# Patient Record
Sex: MALE | Race: ASIAN | NOT HISPANIC OR LATINO | URBAN - METROPOLITAN AREA
[De-identification: names, ages, dates, MRNs, and addresses within clinical notes are randomized per-mention and may not be internally consistent; named-entity substitution may affect disease eponyms.]

---

## 2017-04-20 ENCOUNTER — OUTPATIENT (OUTPATIENT)
Dept: OUTPATIENT SERVICES | Facility: HOSPITAL | Age: 72
LOS: 1 days | End: 2017-04-20
Payer: COMMERCIAL

## 2017-04-20 VITALS
HEIGHT: 60.5 IN | DIASTOLIC BLOOD PRESSURE: 80 MMHG | WEIGHT: 111.99 LBS | SYSTOLIC BLOOD PRESSURE: 144 MMHG | RESPIRATION RATE: 16 BRPM | TEMPERATURE: 97 F | HEART RATE: 86 BPM

## 2017-04-20 DIAGNOSIS — M65.331 TRIGGER FINGER, RIGHT MIDDLE FINGER: ICD-10-CM

## 2017-04-20 DIAGNOSIS — M50.20 OTHER CERVICAL DISC DISPLACEMENT, UNSPECIFIED CERVICAL REGION: Chronic | ICD-10-CM

## 2017-04-20 DIAGNOSIS — T14.8 OTHER INJURY OF UNSPECIFIED BODY REGION: Chronic | ICD-10-CM

## 2017-04-20 DIAGNOSIS — M65.30 TRIGGER FINGER, UNSPECIFIED FINGER: ICD-10-CM

## 2017-04-20 DIAGNOSIS — E11.9 TYPE 2 DIABETES MELLITUS WITHOUT COMPLICATIONS: ICD-10-CM

## 2017-04-20 DIAGNOSIS — D35.2 BENIGN NEOPLASM OF PITUITARY GLAND: ICD-10-CM

## 2017-04-20 DIAGNOSIS — Z98.89 OTHER SPECIFIED POSTPROCEDURAL STATES: Chronic | ICD-10-CM

## 2017-04-20 LAB
BUN SERPL-MCNC: 20 MG/DL — SIGNIFICANT CHANGE UP (ref 7–23)
CALCIUM SERPL-MCNC: 10.1 MG/DL — SIGNIFICANT CHANGE UP (ref 8.4–10.5)
CHLORIDE SERPL-SCNC: 99 MMOL/L — SIGNIFICANT CHANGE UP (ref 98–107)
CO2 SERPL-SCNC: 26 MMOL/L — SIGNIFICANT CHANGE UP (ref 22–31)
CREAT SERPL-MCNC: 0.8 MG/DL — SIGNIFICANT CHANGE UP (ref 0.5–1.3)
GLUCOSE SERPL-MCNC: 87 MG/DL — SIGNIFICANT CHANGE UP (ref 70–99)
HBA1C BLD-MCNC: 6.6 % — HIGH (ref 4–5.6)
HCT VFR BLD CALC: 42.9 % — SIGNIFICANT CHANGE UP (ref 39–50)
HGB BLD-MCNC: 13.7 G/DL — SIGNIFICANT CHANGE UP (ref 13–17)
MCHC RBC-ENTMCNC: 26.2 PG — LOW (ref 27–34)
MCHC RBC-ENTMCNC: 31.9 % — LOW (ref 32–36)
MCV RBC AUTO: 82 FL — SIGNIFICANT CHANGE UP (ref 80–100)
PLATELET # BLD AUTO: 291 K/UL — SIGNIFICANT CHANGE UP (ref 150–400)
PMV BLD: 9.9 FL — SIGNIFICANT CHANGE UP (ref 7–13)
POTASSIUM SERPL-MCNC: 4.3 MMOL/L — SIGNIFICANT CHANGE UP (ref 3.5–5.3)
POTASSIUM SERPL-SCNC: 4.3 MMOL/L — SIGNIFICANT CHANGE UP (ref 3.5–5.3)
RBC # BLD: 5.23 M/UL — SIGNIFICANT CHANGE UP (ref 4.2–5.8)
RBC # FLD: 16.1 % — HIGH (ref 10.3–14.5)
SODIUM SERPL-SCNC: 137 MMOL/L — SIGNIFICANT CHANGE UP (ref 135–145)
WBC # BLD: 9.52 K/UL — SIGNIFICANT CHANGE UP (ref 3.8–10.5)
WBC # FLD AUTO: 9.52 K/UL — SIGNIFICANT CHANGE UP (ref 3.8–10.5)

## 2017-04-20 PROCEDURE — 93010 ELECTROCARDIOGRAM REPORT: CPT

## 2017-04-20 NOTE — H&P PST ADULT - FAMILY HISTORY
Father  Still living? Unknown  Family history of diabetes mellitus, Age at diagnosis: Age Unknown     Mother  Still living? No  Family history of diabetes mellitus, Age at diagnosis: Age Unknown

## 2017-04-20 NOTE — H&P PST ADULT - HISTORY OF PRESENT ILLNESS
70y/o male presents for preop eval for scheduled right middle finger trigger release on 4/26/17.  Per pt had trigger finger for approximately two years, treated with cortisone injections X2, last dose 3-4 months ago.

## 2017-04-20 NOTE — H&P PST ADULT - PROBLEM SELECTOR PLAN 2
Fs on admit   Pt instructed last dose Jardiance  on 4/24/17  and no diabetic medications on morning of surgery

## 2017-04-20 NOTE — H&P PST ADULT - PROBLEM SELECTOR PLAN 1
scheduled right middle finger trigger release on 4/26/17.  preop instructions, gi prophylaxis given  pt verbalized understanding  pending copy of comparison EKG
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
## 2017-04-20 NOTE — H&P PST ADULT - NSANTHOSAYNRD_GEN_A_CORE
No. RITA screening performed.  STOP BANG Legend: 0-2 = LOW Risk; 3-4 = INTERMEDIATE Risk; 5-8 = HIGH Risk

## 2017-04-20 NOTE — H&P PST ADULT - MUSCULOSKELETAL
negative detailed exam ROM intact/no joint erythema/no joint swelling details… no joint swelling/no joint erythema/right middle finger/decreased ROM due to pain

## 2017-04-20 NOTE — H&P PST ADULT - PMH
BPH (benign prostatic hypertrophy)    Diabetes mellitus type II, controlled    HLD (hyperlipidemia)    HTN (hypertension)  h/o  Trigger finger BPH (benign prostatic hypertrophy)    Diabetes mellitus type II, controlled    HLD (hyperlipidemia)    HTN (hypertension)  h/o  Pituitary adenoma    Trigger finger

## 2017-04-20 NOTE — H&P PST ADULT - NEGATIVE CARDIOVASCULAR SYMPTOMS
no dyspnea on exertion/no claudication/no peripheral edema/no chest pain/no palpitations/no orthopnea/no paroxysmal nocturnal dyspnea

## 2017-04-20 NOTE — H&P PST ADULT - ENDOCRINE COMMENTS
daily fasting FS under 120 daily fasting FS under 90 daily fasting home  FS monitoring done, today 90

## 2017-04-26 ENCOUNTER — OUTPATIENT (OUTPATIENT)
Dept: OUTPATIENT SERVICES | Facility: HOSPITAL | Age: 72
LOS: 1 days | Discharge: HOME | End: 2017-04-26

## 2017-04-26 ENCOUNTER — TRANSCRIPTION ENCOUNTER (OUTPATIENT)
Age: 72
End: 2017-04-26

## 2017-04-26 VITALS
DIASTOLIC BLOOD PRESSURE: 77 MMHG | RESPIRATION RATE: 13 BRPM | TEMPERATURE: 97 F | SYSTOLIC BLOOD PRESSURE: 148 MMHG | OXYGEN SATURATION: 97 % | HEART RATE: 74 BPM

## 2017-04-26 VITALS
OXYGEN SATURATION: 98 % | WEIGHT: 111.99 LBS | RESPIRATION RATE: 18 BRPM | HEART RATE: 73 BPM | TEMPERATURE: 98 F | HEIGHT: 60.5 IN | DIASTOLIC BLOOD PRESSURE: 87 MMHG | SYSTOLIC BLOOD PRESSURE: 138 MMHG

## 2017-04-26 DIAGNOSIS — Z98.89 OTHER SPECIFIED POSTPROCEDURAL STATES: Chronic | ICD-10-CM

## 2017-04-26 DIAGNOSIS — T14.8 OTHER INJURY OF UNSPECIFIED BODY REGION: Chronic | ICD-10-CM

## 2017-04-26 DIAGNOSIS — M50.20 OTHER CERVICAL DISC DISPLACEMENT, UNSPECIFIED CERVICAL REGION: Chronic | ICD-10-CM

## 2017-04-26 DIAGNOSIS — M65.331 TRIGGER FINGER, RIGHT MIDDLE FINGER: ICD-10-CM

## 2017-04-26 NOTE — ASU DISCHARGE PLAN (ADULT/PEDIATRIC). - NURSING INSTRUCTIONS
Narcotic pain medication may cause nausea or constipation. Take medication with food. Increase fluids and fiber intake.   Apply ice for 20 minutes several times per day for the next 24-48 hours, then as needed for comfort.

## 2017-04-26 NOTE — ASU DISCHARGE PLAN (ADULT/PEDIATRIC). - NOTIFY
Bleeding that does not stop/Persistent Nausea and Vomiting/Inability to Tolerate Liquids or Foods/Numbness, color, or temperature change to extremity/Fever greater than 101/Swelling that continues/Pain not relieved by Medications/Unable to Urinate

## 2018-04-30 ENCOUNTER — OUTPATIENT (OUTPATIENT)
Dept: OUTPATIENT SERVICES | Facility: HOSPITAL | Age: 73
LOS: 1 days | End: 2018-04-30
Payer: MEDICARE

## 2018-04-30 VITALS
WEIGHT: 113.98 LBS | DIASTOLIC BLOOD PRESSURE: 70 MMHG | SYSTOLIC BLOOD PRESSURE: 110 MMHG | HEIGHT: 60 IN | RESPIRATION RATE: 16 BRPM | TEMPERATURE: 98 F | OXYGEN SATURATION: 98 % | HEART RATE: 84 BPM

## 2018-04-30 DIAGNOSIS — T14.8 OTHER INJURY OF UNSPECIFIED BODY REGION: Chronic | ICD-10-CM

## 2018-04-30 DIAGNOSIS — M65.331 TRIGGER FINGER, RIGHT MIDDLE FINGER: Chronic | ICD-10-CM

## 2018-04-30 DIAGNOSIS — Z98.89 OTHER SPECIFIED POSTPROCEDURAL STATES: Chronic | ICD-10-CM

## 2018-04-30 DIAGNOSIS — M24.852 OTHER SPECIFIC JOINT DERANGEMENTS OF LEFT HIP, NOT ELSEWHERE CLASSIFIED: ICD-10-CM

## 2018-04-30 DIAGNOSIS — M50.20 OTHER CERVICAL DISC DISPLACEMENT, UNSPECIFIED CERVICAL REGION: Chronic | ICD-10-CM

## 2018-04-30 DIAGNOSIS — Z01.818 ENCOUNTER FOR OTHER PREPROCEDURAL EXAMINATION: ICD-10-CM

## 2018-04-30 PROCEDURE — G0463: CPT

## 2018-04-30 RX ORDER — ACETAMINOPHEN 500 MG
975 TABLET ORAL ONCE
Qty: 0 | Refills: 0 | Status: COMPLETED | OUTPATIENT
Start: 2018-05-08 | End: 2018-05-08

## 2018-04-30 RX ORDER — SODIUM CHLORIDE 9 MG/ML
3 INJECTION INTRAMUSCULAR; INTRAVENOUS; SUBCUTANEOUS EVERY 8 HOURS
Qty: 0 | Refills: 0 | Status: DISCONTINUED | OUTPATIENT
Start: 2018-05-08 | End: 2018-05-16

## 2018-04-30 NOTE — H&P PST ADULT - PMH
BPH (benign prostatic hypertrophy)    Carpal tunnel syndrome, left    Diabetes mellitus type II, controlled    Herniated disc, cervical  hx of , corected with  sx- no limited ROM  HLD (hyperlipidemia)  h/o of , now tx with diet , no meds  HTN (hypertension)  h/o of , now tx with diet , no meds  Left wrist fracture  hx of  Non-recurrent unilateral inguinal hernia without obstruction or gangrene    Other specific joint derangement of left hip, not elsewhere classified    Pituitary adenoma  dx in 2014, MRI of brain done, pt is taking meds for hyperprolactinemia  Trigger finger  right middle finger

## 2018-04-30 NOTE — H&P PST ADULT - GASTROINTESTINAL DETAILS
no distention/no masses palpable/soft/nontender/normal/no guarding/bowel sounds normal/no bruit/no rebound tenderness

## 2018-04-30 NOTE — H&P PST ADULT - NEGATIVE NEUROLOGICAL SYMPTOMS
no headache/no confusion/no paresthesias/no syncope/no vertigo/no loss of consciousness/no tremors/no hemiparesis/no weakness/no generalized seizures/no difficulty walking/no transient paralysis/no focal seizures/no loss of sensation

## 2018-04-30 NOTE — H&P PST ADULT - NEGATIVE OPHTHALMOLOGIC SYMPTOMS
reading and distance glasses/no irritation L/no irritation R/no loss of vision L/no loss of vision R

## 2018-04-30 NOTE — H&P PST ADULT - RS GEN PE MLT RESP DETAILS PC
breath sounds equal/respirations non-labored/no wheezes/clear to auscultation bilaterally/good air movement/airway patent/no rhonchi/no rales

## 2018-04-30 NOTE — H&P PST ADULT - PROBLEM SELECTOR PLAN 1
Patient  is scheduled for diagnostic arthroscopy of the left hip on 5/8/18. patient is at moderate risk for this surgery.

## 2018-04-30 NOTE — H&P PST ADULT - MUSCULOSKELETAL
details… detailed exam no joint swelling/no joint erythema/no joint warmth/diminished strength/no calf tenderness/left hip, pain on rotation and abduction of left hip, tenderness to deep palpation/decreased ROM due to pain

## 2018-04-30 NOTE — H&P PST ADULT - NEGATIVE GENERAL GENITOURINARY SYMPTOMS
no flank pain R/no bladder infections/no urinary hesitancy/hx of BPH/no urine discoloration/no renal colic/no dysuria/normal urinary frequency/no hematuria/no flank pain L

## 2018-04-30 NOTE — H&P PST ADULT - HISTORY OF PRESENT ILLNESS
72 years old male presented with left hip pain  x 1year, related to possible fall, pt was dx with other specific joint derangements of left hip, pt is scheduled for diagnostic arthroscopy of the left hip on 5/8/18.
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
## 2018-04-30 NOTE — H&P PST ADULT - NEGATIVE CARDIOVASCULAR SYMPTOMS
no orthopnea/no claudication/no dyspnea on exertion/no chest pain/no paroxysmal nocturnal dyspnea/no peripheral edema/no palpitations

## 2018-05-07 ENCOUNTER — TRANSCRIPTION ENCOUNTER (OUTPATIENT)
Age: 73
End: 2018-05-07

## 2018-05-08 ENCOUNTER — OUTPATIENT (OUTPATIENT)
Dept: OUTPATIENT SERVICES | Facility: HOSPITAL | Age: 73
LOS: 1 days | End: 2018-05-08
Payer: MEDICARE

## 2018-05-08 VITALS
TEMPERATURE: 98 F | RESPIRATION RATE: 14 BRPM | OXYGEN SATURATION: 99 % | WEIGHT: 113.98 LBS | HEART RATE: 70 BPM | SYSTOLIC BLOOD PRESSURE: 147 MMHG | DIASTOLIC BLOOD PRESSURE: 82 MMHG

## 2018-05-08 VITALS
OXYGEN SATURATION: 100 % | SYSTOLIC BLOOD PRESSURE: 167 MMHG | TEMPERATURE: 98 F | HEART RATE: 61 BPM | RESPIRATION RATE: 14 BRPM | DIASTOLIC BLOOD PRESSURE: 84 MMHG

## 2018-05-08 DIAGNOSIS — M24.852 OTHER SPECIFIC JOINT DERANGEMENTS OF LEFT HIP, NOT ELSEWHERE CLASSIFIED: ICD-10-CM

## 2018-05-08 DIAGNOSIS — Z01.818 ENCOUNTER FOR OTHER PREPROCEDURAL EXAMINATION: ICD-10-CM

## 2018-05-08 DIAGNOSIS — Z98.89 OTHER SPECIFIED POSTPROCEDURAL STATES: Chronic | ICD-10-CM

## 2018-05-08 DIAGNOSIS — M50.20 OTHER CERVICAL DISC DISPLACEMENT, UNSPECIFIED CERVICAL REGION: Chronic | ICD-10-CM

## 2018-05-08 DIAGNOSIS — M65.331 TRIGGER FINGER, RIGHT MIDDLE FINGER: Chronic | ICD-10-CM

## 2018-05-08 DIAGNOSIS — T14.8 OTHER INJURY OF UNSPECIFIED BODY REGION: Chronic | ICD-10-CM

## 2018-05-08 LAB
GLUCOSE BLDC GLUCOMTR-MCNC: 86 MG/DL — SIGNIFICANT CHANGE UP (ref 70–99)
GLUCOSE BLDC GLUCOMTR-MCNC: 90 MG/DL — SIGNIFICANT CHANGE UP (ref 70–99)

## 2018-05-08 PROCEDURE — 76000 FLUOROSCOPY <1 HR PHYS/QHP: CPT

## 2018-05-08 PROCEDURE — 29860 HIP ARTHROSCOPY DX: CPT | Mod: AS,LT

## 2018-05-08 PROCEDURE — 29862 HIP ARTHR0 W/DEBRIDEMENT: CPT | Mod: LT

## 2018-05-08 PROCEDURE — 82962 GLUCOSE BLOOD TEST: CPT

## 2018-05-08 PROCEDURE — 77071 MNL APPL STRS JT RADIOGRAPHY: CPT

## 2018-05-08 PROCEDURE — 76000 FLUOROSCOPY <1 HR PHYS/QHP: CPT | Mod: 26

## 2018-05-08 RX ORDER — OXYCODONE AND ACETAMINOPHEN 5; 325 MG/1; MG/1
2 TABLET ORAL EVERY 6 HOURS
Qty: 0 | Refills: 0 | Status: DISCONTINUED | OUTPATIENT
Start: 2018-05-08 | End: 2018-05-08

## 2018-05-08 RX ORDER — CELECOXIB 200 MG/1
200 CAPSULE ORAL ONCE
Qty: 0 | Refills: 0 | Status: COMPLETED | OUTPATIENT
Start: 2018-05-08 | End: 2018-05-08

## 2018-05-08 RX ORDER — ONDANSETRON 8 MG/1
4 TABLET, FILM COATED ORAL EVERY 6 HOURS
Qty: 0 | Refills: 0 | Status: DISCONTINUED | OUTPATIENT
Start: 2018-05-08 | End: 2018-05-16

## 2018-05-08 RX ORDER — SODIUM CHLORIDE 9 MG/ML
1000 INJECTION, SOLUTION INTRAVENOUS
Qty: 0 | Refills: 0 | Status: DISCONTINUED | OUTPATIENT
Start: 2018-05-08 | End: 2018-05-16

## 2018-05-08 RX ORDER — OXYCODONE AND ACETAMINOPHEN 5; 325 MG/1; MG/1
1 TABLET ORAL EVERY 4 HOURS
Qty: 0 | Refills: 0 | Status: DISCONTINUED | OUTPATIENT
Start: 2018-05-08 | End: 2018-05-08

## 2018-05-08 RX ORDER — HYDROMORPHONE HYDROCHLORIDE 2 MG/ML
0.5 INJECTION INTRAMUSCULAR; INTRAVENOUS; SUBCUTANEOUS
Qty: 0 | Refills: 0 | Status: DISCONTINUED | OUTPATIENT
Start: 2018-05-08 | End: 2018-05-08

## 2018-05-08 RX ADMIN — Medication 975 MILLIGRAM(S): at 08:08

## 2018-05-08 RX ADMIN — CELECOXIB 200 MILLIGRAM(S): 200 CAPSULE ORAL at 08:08

## 2018-05-08 RX ADMIN — SODIUM CHLORIDE 3 MILLILITER(S): 9 INJECTION INTRAMUSCULAR; INTRAVENOUS; SUBCUTANEOUS at 08:00

## 2018-05-08 NOTE — ASU DISCHARGE PLAN (ADULT/PEDIATRIC). - NOTIFY
Persistent Nausea and Vomiting/Fever greater than 101/Numbness, color, or temperature change to extremity/Pain not relieved by Medications/Bleeding that does not stop/Numbness, tingling/Swelling that continues/Unable to Urinate

## 2018-05-08 NOTE — BRIEF OPERATIVE NOTE - PROCEDURE
<<-----Click on this checkbox to enter Procedure Hip arthroscopy, diagnostic  05/08/2018    Active  VALE

## 2018-05-08 NOTE — ASU DISCHARGE PLAN (ADULT/PEDIATRIC). - MEDICATION SUMMARY - MEDICATIONS TO TAKE
I will START or STAY ON the medications listed below when I get home from the hospital:    Percocet 5/325 oral tablet  -- 1-2  tab(s) by mouth every 6 hours, As Needed -for moderate pain MDD:6 Supervising MD: Dr. Issa Freeman. Lic#[id number] NPI [id number]   -- Caution federal law prohibits the transfer of this drug to any person other  than the person for whom it was prescribed.  May cause drowsiness.  Alcohol may intensify this effect.  Use care when operating dangerous machinery.  This prescription cannot be refilled.  This product contains acetaminophen.  Do not use  with any other product containing acetaminophen to prevent possible liver damage.  Using more of this medication than prescribed may cause serious breathing problems.    -- Indication: For pain    metFORMIN 1000 mg oral tablet  -- 1 tab(s) by mouth 2 times a day  -- Indication: For as per md    Januvia 100 mg oral tablet  -- 1 tab(s) by mouth once a day  -- Indication: For as per md    Jardiance 10 mg oral tablet  -- 1 tab(s) by mouth once a day (in the morning)  -- Indication: For as per md    glimepiride 4 mg oral tablet  -- 1 tab(s) by mouth 2 times a day  -- Indication: For as per md    cabergoline 0.5 mg oral tablet  -- 0.5 tab(s) by mouth 2 times a week  -- Indication: For as per md    Centrum oral tablet  -- 1 tab(s) by mouth once a day  -- Indication: For as per md    Vitamin C 1000 mg oral tablet  -- 1 tab(s) by mouth once a day  -- Indication: For as per md    Vitamin D3 5000 intl units oral capsule  -- 1 cap(s) by mouth once a day  -- Indication: For as per md

## 2018-07-16 PROBLEM — M65.30 TRIGGER FINGER, UNSPECIFIED FINGER: Chronic | Status: ACTIVE | Noted: 2017-04-20

## 2018-07-17 PROBLEM — M50.20 OTHER CERVICAL DISC DISPLACEMENT, UNSPECIFIED CERVICAL REGION: Chronic | Status: ACTIVE | Noted: 2018-04-30

## 2019-07-09 ENCOUNTER — INPATIENT (INPATIENT)
Facility: HOSPITAL | Age: 74
LOS: 2 days | Discharge: HOME | End: 2019-07-12
Attending: HOSPITALIST | Admitting: HOSPITALIST
Payer: MEDICARE

## 2019-07-09 VITALS
TEMPERATURE: 98 F | HEART RATE: 88 BPM | RESPIRATION RATE: 16 BRPM | SYSTOLIC BLOOD PRESSURE: 154 MMHG | DIASTOLIC BLOOD PRESSURE: 72 MMHG | OXYGEN SATURATION: 97 %

## 2019-07-09 DIAGNOSIS — M50.20 OTHER CERVICAL DISC DISPLACEMENT, UNSPECIFIED CERVICAL REGION: Chronic | ICD-10-CM

## 2019-07-09 DIAGNOSIS — Z98.89 OTHER SPECIFIED POSTPROCEDURAL STATES: Chronic | ICD-10-CM

## 2019-07-09 DIAGNOSIS — T14.8 OTHER INJURY OF UNSPECIFIED BODY REGION: Chronic | ICD-10-CM

## 2019-07-09 DIAGNOSIS — M65.331 TRIGGER FINGER, RIGHT MIDDLE FINGER: Chronic | ICD-10-CM

## 2019-07-09 LAB
ALBUMIN SERPL ELPH-MCNC: 4.4 G/DL — SIGNIFICANT CHANGE UP (ref 3.3–5)
ALP SERPL-CCNC: 56 U/L — SIGNIFICANT CHANGE UP (ref 40–120)
ALT FLD-CCNC: 37 U/L — SIGNIFICANT CHANGE UP (ref 4–41)
ANION GAP SERPL CALC-SCNC: 16 MMO/L — HIGH (ref 7–14)
APPEARANCE UR: CLEAR — SIGNIFICANT CHANGE UP
AST SERPL-CCNC: 32 U/L — SIGNIFICANT CHANGE UP (ref 4–40)
B PERT DNA SPEC QL NAA+PROBE: NOT DETECTED — SIGNIFICANT CHANGE UP
BASE EXCESS BLDV CALC-SCNC: 2.8 MMOL/L — SIGNIFICANT CHANGE UP
BILIRUB SERPL-MCNC: 0.3 MG/DL — SIGNIFICANT CHANGE UP (ref 0.2–1.2)
BILIRUB UR-MCNC: NEGATIVE — SIGNIFICANT CHANGE UP
BLOOD GAS VENOUS - CREATININE: 0.85 MG/DL — SIGNIFICANT CHANGE UP (ref 0.5–1.3)
BLOOD GAS VENOUS - FIO2: 21 — SIGNIFICANT CHANGE UP
BLOOD UR QL VISUAL: NEGATIVE — SIGNIFICANT CHANGE UP
BUN SERPL-MCNC: 13 MG/DL — SIGNIFICANT CHANGE UP (ref 7–23)
C PNEUM DNA SPEC QL NAA+PROBE: NOT DETECTED — SIGNIFICANT CHANGE UP
CALCIUM SERPL-MCNC: 9.3 MG/DL — SIGNIFICANT CHANGE UP (ref 8.4–10.5)
CHLORIDE BLDV-SCNC: 96 MMOL/L — SIGNIFICANT CHANGE UP (ref 96–108)
CHLORIDE SERPL-SCNC: 92 MMOL/L — LOW (ref 98–107)
CO2 SERPL-SCNC: 22 MMOL/L — SIGNIFICANT CHANGE UP (ref 22–31)
COLOR SPEC: SIGNIFICANT CHANGE UP
CREAT SERPL-MCNC: 0.79 MG/DL — SIGNIFICANT CHANGE UP (ref 0.5–1.3)
FLUAV H1 2009 PAND RNA SPEC QL NAA+PROBE: DETECTED — HIGH
FLUAV H1 RNA SPEC QL NAA+PROBE: NOT DETECTED — SIGNIFICANT CHANGE UP
FLUAV H3 RNA SPEC QL NAA+PROBE: NOT DETECTED — SIGNIFICANT CHANGE UP
FLUBV RNA SPEC QL NAA+PROBE: NOT DETECTED — SIGNIFICANT CHANGE UP
GAS PNL BLDV: 125 MMOL/L — LOW (ref 136–146)
GLUCOSE BLDV-MCNC: 254 MG/DL — HIGH (ref 70–99)
GLUCOSE SERPL-MCNC: 257 MG/DL — HIGH (ref 70–99)
GLUCOSE UR-MCNC: >1000 — HIGH
HADV DNA SPEC QL NAA+PROBE: NOT DETECTED — SIGNIFICANT CHANGE UP
HCO3 BLDV-SCNC: 25 MMOL/L — SIGNIFICANT CHANGE UP (ref 20–27)
HCOV PNL SPEC NAA+PROBE: SIGNIFICANT CHANGE UP
HCT VFR BLD CALC: 43 % — SIGNIFICANT CHANGE UP (ref 39–50)
HCT VFR BLDV CALC: 44.3 % — SIGNIFICANT CHANGE UP (ref 39–51)
HGB BLD-MCNC: 14 G/DL — SIGNIFICANT CHANGE UP (ref 13–17)
HGB BLDV-MCNC: 14.4 G/DL — SIGNIFICANT CHANGE UP (ref 13–17)
HMPV RNA SPEC QL NAA+PROBE: NOT DETECTED — SIGNIFICANT CHANGE UP
HPIV1 RNA SPEC QL NAA+PROBE: NOT DETECTED — SIGNIFICANT CHANGE UP
HPIV2 RNA SPEC QL NAA+PROBE: NOT DETECTED — SIGNIFICANT CHANGE UP
HPIV3 RNA SPEC QL NAA+PROBE: NOT DETECTED — SIGNIFICANT CHANGE UP
HPIV4 RNA SPEC QL NAA+PROBE: NOT DETECTED — SIGNIFICANT CHANGE UP
KETONES UR-MCNC: SIGNIFICANT CHANGE UP
LACTATE BLDV-MCNC: 2.5 MMOL/L — HIGH (ref 0.5–2)
LEUKOCYTE ESTERASE UR-ACNC: NEGATIVE — SIGNIFICANT CHANGE UP
MCHC RBC-ENTMCNC: 27.2 PG — SIGNIFICANT CHANGE UP (ref 27–34)
MCHC RBC-ENTMCNC: 32.6 % — SIGNIFICANT CHANGE UP (ref 32–36)
MCV RBC AUTO: 83.5 FL — SIGNIFICANT CHANGE UP (ref 80–100)
NITRITE UR-MCNC: NEGATIVE — SIGNIFICANT CHANGE UP
NRBC # FLD: 0 K/UL — SIGNIFICANT CHANGE UP (ref 0–0)
PCO2 BLDV: 47 MMHG — SIGNIFICANT CHANGE UP (ref 41–51)
PH BLDV: 7.39 PH — SIGNIFICANT CHANGE UP (ref 7.32–7.43)
PH UR: 6.5 — SIGNIFICANT CHANGE UP (ref 5–8)
PLATELET # BLD AUTO: 206 K/UL — SIGNIFICANT CHANGE UP (ref 150–400)
PMV BLD: 9.4 FL — SIGNIFICANT CHANGE UP (ref 7–13)
PO2 BLDV: 28 MMHG — LOW (ref 35–40)
POTASSIUM BLDV-SCNC: 4.2 MMOL/L — SIGNIFICANT CHANGE UP (ref 3.4–4.5)
POTASSIUM SERPL-MCNC: 5.1 MMOL/L — SIGNIFICANT CHANGE UP (ref 3.5–5.3)
POTASSIUM SERPL-SCNC: 5.1 MMOL/L — SIGNIFICANT CHANGE UP (ref 3.5–5.3)
PROT SERPL-MCNC: 7.7 G/DL — SIGNIFICANT CHANGE UP (ref 6–8.3)
PROT UR-MCNC: NEGATIVE — SIGNIFICANT CHANGE UP
RBC # BLD: 5.15 M/UL — SIGNIFICANT CHANGE UP (ref 4.2–5.8)
RBC # FLD: 14.5 % — SIGNIFICANT CHANGE UP (ref 10.3–14.5)
RSV RNA SPEC QL NAA+PROBE: NOT DETECTED — SIGNIFICANT CHANGE UP
RV+EV RNA SPEC QL NAA+PROBE: NOT DETECTED — SIGNIFICANT CHANGE UP
SAO2 % BLDV: 51.4 % — LOW (ref 60–85)
SODIUM SERPL-SCNC: 130 MMOL/L — LOW (ref 135–145)
SP GR SPEC: 1.02 — SIGNIFICANT CHANGE UP (ref 1–1.04)
UROBILINOGEN FLD QL: NORMAL — SIGNIFICANT CHANGE UP
WBC # BLD: 6.91 K/UL — SIGNIFICANT CHANGE UP (ref 3.8–10.5)
WBC # FLD AUTO: 6.91 K/UL — SIGNIFICANT CHANGE UP (ref 3.8–10.5)

## 2019-07-09 PROCEDURE — 70450 CT HEAD/BRAIN W/O DYE: CPT | Mod: 26

## 2019-07-09 PROCEDURE — 71045 X-RAY EXAM CHEST 1 VIEW: CPT | Mod: 26

## 2019-07-09 RX ORDER — SODIUM CHLORIDE 9 MG/ML
2000 INJECTION INTRAMUSCULAR; INTRAVENOUS; SUBCUTANEOUS ONCE
Refills: 0 | Status: COMPLETED | OUTPATIENT
Start: 2019-07-09 | End: 2019-07-09

## 2019-07-09 RX ORDER — AZITHROMYCIN 500 MG/1
500 TABLET, FILM COATED ORAL ONCE
Refills: 0 | Status: COMPLETED | OUTPATIENT
Start: 2019-07-09 | End: 2019-07-09

## 2019-07-09 RX ORDER — ACETAMINOPHEN 500 MG
650 TABLET ORAL ONCE
Refills: 0 | Status: COMPLETED | OUTPATIENT
Start: 2019-07-09 | End: 2019-07-09

## 2019-07-09 RX ORDER — CEFTRIAXONE 500 MG/1
1000 INJECTION, POWDER, FOR SOLUTION INTRAMUSCULAR; INTRAVENOUS ONCE
Refills: 0 | Status: COMPLETED | OUTPATIENT
Start: 2019-07-09 | End: 2019-07-09

## 2019-07-09 RX ORDER — ONDANSETRON 8 MG/1
4 TABLET, FILM COATED ORAL ONCE
Refills: 0 | Status: COMPLETED | OUTPATIENT
Start: 2019-07-09 | End: 2019-07-09

## 2019-07-09 RX ADMIN — CEFTRIAXONE 100 MILLIGRAM(S): 500 INJECTION, POWDER, FOR SOLUTION INTRAMUSCULAR; INTRAVENOUS at 21:21

## 2019-07-09 RX ADMIN — Medication 650 MILLIGRAM(S): at 21:20

## 2019-07-09 RX ADMIN — ONDANSETRON 4 MILLIGRAM(S): 8 TABLET, FILM COATED ORAL at 21:21

## 2019-07-09 RX ADMIN — SODIUM CHLORIDE 2000 MILLILITER(S): 9 INJECTION INTRAMUSCULAR; INTRAVENOUS; SUBCUTANEOUS at 21:21

## 2019-07-09 NOTE — ED ADULT NURSE REASSESSMENT NOTE - NS ED NURSE REASSESS COMMENT FT1
pt found laying supine on floor near stretcher, pt denies pain, no bruising abrasions or bleeding noted. MD Urban and MD Mei aware and at bedside for eval. order for head placed. VS as noted. pt A&Ox2 disoriented to time. staff member at bedside for enhanced supervision.

## 2019-07-09 NOTE — ED PROVIDER NOTE - OBJECTIVE STATEMENT
74yo M h/o DM p/w AMS, fever x4d Tm 105. + productive cough. + n/v. history gathered entirely from patient. reports some confusion over the last several days. Had been eating/drinking well prior to 4d ago. denies urinary symptoms.

## 2019-07-09 NOTE — ED PROVIDER NOTE - PHYSICAL EXAMINATION
Vitals: WNL  Gen: laying comfortably in NAD, urinated on himself, coughing  Head: NCAT  ENT: sclerae white, anicterus, moist mucous membranes. No exudates.   CV: RRR. Audible S1 and S2. No murmurs, rubs, gallops, S3, nor S4  Pulm: Clear to auscultation bilaterally. No wheezes, rales, or rhonchi  Abd: soft, normoactive BS x4, NTND, no rebound, no guarding, no rashes  Musculoskeletal:  No peripheral edema  Skin: no lesions or scars noted  Neurologic: AAOx3  : no CVA tenderness

## 2019-07-09 NOTE — ED ADULT NURSE REASSESSMENT NOTE - INTERVENTIONS DEFINITIONS
Monitor for mental status changes and reorient to person, place, and time/Stretcher in lowest position, wheels locked, appropriate side rails in place/Non-slip footwear when patient is off stretcher/Provide visual cue, wrist band, yellow gown, etc./Provide visual clues: red socks/Physically safe environment: no spills, clutter or unnecessary equipment

## 2019-07-09 NOTE — ED PROVIDER NOTE - ATTENDING CONTRIBUTION TO CARE
agree with resident note  "72yo M h/o DM p/w AMS, fever x4d Tm 105. + productive cough. + n/v. history gathered entirely from patient. reports some confusion over the last several days. Had been eating/drinking well prior to 4d ago. denies urinary symptoms."  States was confused but better today    PE: febrile, tachycardic, CTAB/L; s1 s2 no m/r/g abd soft/NT/ND ext: no edema    Imp: sepsis; RVP returned back +influenza; admit given age and vital signs

## 2019-07-09 NOTE — ED PROVIDER NOTE - PROGRESS NOTE DETAILS
Sigifredo ORTEGA: called to bedside, pt found on the ground beside bed. pt reports he was trying to walk and fell. pt intermittently confused. will obtain CTH.

## 2019-07-09 NOTE — ED ADULT NURSE NOTE - CHIEF COMPLAINT QUOTE
brought in by EMS from MD office for C/o generalized weakness and was vomiting at MD office. As per EMS, wife stated to them that he has been having fevers at home and was hallucinating. Pt was picking things out in the air with fingers. RF=548. Pt on 3L NC by EMS, #20 angio to left wrist with 300ml NS bolus infused.

## 2019-07-09 NOTE — ED ADULT NURSE NOTE - OBJECTIVE STATEMENT
RN Facilitator: PT received into room 9 A&Ox4, ambulatory C/O fevers, nausea, vomiting & generalized weakness x 3 days. Pt states symptoms worsened today, went to PCP and was told to come to ED. PMH: DM. Denies CP, SOB, urinary symptoms. Md evaluated, VS as noted. 18 G IV placed to R ARM, labs sent, cultures sent. Medicated as ordered. Comfort measures provided, call bell within reach,. Report given to primary area RN.

## 2019-07-09 NOTE — ED ADULT TRIAGE NOTE - CHIEF COMPLAINT QUOTE
brought in by EMS from MD office for sepsis vs CVA. C/o generalized weakness and was vomiting at MD office. As per EMS, wife stated to them that he has been having fevers at home and was hallucinating. Pt was picking things out in the air with fingers. HA=555. Pt on #L NC by EMS, #20 angio to left wrist with 300ml NS bolus infused. brought in by EMS from MD office for C/o generalized weakness and was vomiting at MD office. As per EMS, wife stated to them that he has been having fevers at home and was hallucinating. Pt was picking things out in the air with fingers. JL=149. Pt on #L NC by EMS, #20 angio to left wrist with 300ml NS bolus infused. brought in by EMS from MD office for C/o generalized weakness and was vomiting at MD office. As per EMS, wife stated to them that he has been having fevers at home and was hallucinating. Pt was picking things out in the air with fingers. SB=484. Pt on 3L NC by EMS, #20 angio to left wrist with 300ml NS bolus infused.

## 2019-07-09 NOTE — ED PROVIDER NOTE - NS ED ROS FT
Constitutional: no fevers, chills  HEENT: no visual changes, no sore throat, no rhinorrhea  CV: no cp  Resp: no sob  GI: no abd pain,+ n/v, no diarrhea/constipation  : no dysuria, hematuria  MSK: no joint pains  skin: no rashes  neuro: no HA, no confusion  psych: no SI/HI  heme: no LAD

## 2019-07-10 DIAGNOSIS — Z29.9 ENCOUNTER FOR PROPHYLACTIC MEASURES, UNSPECIFIED: ICD-10-CM

## 2019-07-10 DIAGNOSIS — A41.9 SEPSIS, UNSPECIFIED ORGANISM: ICD-10-CM

## 2019-07-10 DIAGNOSIS — J10.1 INFLUENZA DUE TO OTHER IDENTIFIED INFLUENZA VIRUS WITH OTHER RESPIRATORY MANIFESTATIONS: ICD-10-CM

## 2019-07-10 DIAGNOSIS — G92 TOXIC ENCEPHALOPATHY: ICD-10-CM

## 2019-07-10 DIAGNOSIS — M25.531 PAIN IN RIGHT WRIST: ICD-10-CM

## 2019-07-10 DIAGNOSIS — D35.2 BENIGN NEOPLASM OF PITUITARY GLAND: ICD-10-CM

## 2019-07-10 DIAGNOSIS — E11.9 TYPE 2 DIABETES MELLITUS WITHOUT COMPLICATIONS: ICD-10-CM

## 2019-07-10 DIAGNOSIS — R63.8 OTHER SYMPTOMS AND SIGNS CONCERNING FOOD AND FLUID INTAKE: ICD-10-CM

## 2019-07-10 DIAGNOSIS — E87.1 HYPO-OSMOLALITY AND HYPONATREMIA: ICD-10-CM

## 2019-07-10 DIAGNOSIS — J11.1 INFLUENZA DUE TO UNIDENTIFIED INFLUENZA VIRUS WITH OTHER RESPIRATORY MANIFESTATIONS: ICD-10-CM

## 2019-07-10 PROBLEM — M24.852 OTHER SPECIFIC JOINT DERANGEMENTS OF LEFT HIP, NOT ELSEWHERE CLASSIFIED: Chronic | Status: ACTIVE | Noted: 2018-04-30

## 2019-07-10 PROBLEM — K40.90 UNILATERAL INGUINAL HERNIA, WITHOUT OBSTRUCTION OR GANGRENE, NOT SPECIFIED AS RECURRENT: Chronic | Status: ACTIVE | Noted: 2018-04-30

## 2019-07-10 PROBLEM — G56.02 CARPAL TUNNEL SYNDROME, LEFT UPPER LIMB: Chronic | Status: ACTIVE | Noted: 2018-04-30

## 2019-07-10 PROBLEM — S62.102A FRACTURE OF UNSPECIFIED CARPAL BONE, LEFT WRIST, INITIAL ENCOUNTER FOR CLOSED FRACTURE: Chronic | Status: ACTIVE | Noted: 2018-04-30

## 2019-07-10 LAB
ANION GAP SERPL CALC-SCNC: 10 MMO/L — SIGNIFICANT CHANGE UP (ref 7–14)
ANISOCYTOSIS BLD QL: SLIGHT — SIGNIFICANT CHANGE UP
BASE EXCESS BLDV CALC-SCNC: -0.3 MMOL/L — SIGNIFICANT CHANGE UP
BASE EXCESS BLDV CALC-SCNC: 2.2 MMOL/L — SIGNIFICANT CHANGE UP
BASOPHILS # BLD AUTO: 0.01 K/UL — SIGNIFICANT CHANGE UP (ref 0–0.2)
BASOPHILS NFR BLD AUTO: 0.2 % — SIGNIFICANT CHANGE UP (ref 0–2)
BASOPHILS NFR SPEC: 0 % — SIGNIFICANT CHANGE UP (ref 0–2)
BLASTS # FLD: 0 % — SIGNIFICANT CHANGE UP (ref 0–0)
BLOOD GAS VENOUS - CREATININE: 0.76 MG/DL — SIGNIFICANT CHANGE UP (ref 0.5–1.3)
BLOOD GAS VENOUS - FIO2: 21 — SIGNIFICANT CHANGE UP
BUN SERPL-MCNC: 10 MG/DL — SIGNIFICANT CHANGE UP (ref 7–23)
CALCIUM SERPL-MCNC: 8.6 MG/DL — SIGNIFICANT CHANGE UP (ref 8.4–10.5)
CHLORIDE BLDV-SCNC: 96 MMOL/L — SIGNIFICANT CHANGE UP (ref 96–108)
CHLORIDE SERPL-SCNC: 97 MMOL/L — LOW (ref 98–107)
CO2 SERPL-SCNC: 24 MMOL/L — SIGNIFICANT CHANGE UP (ref 22–31)
CREAT SERPL-MCNC: 0.73 MG/DL — SIGNIFICANT CHANGE UP (ref 0.5–1.3)
EOSINOPHIL # BLD AUTO: 0.14 K/UL — SIGNIFICANT CHANGE UP (ref 0–0.5)
EOSINOPHIL NFR BLD AUTO: 2.3 % — SIGNIFICANT CHANGE UP (ref 0–6)
EOSINOPHIL NFR FLD: 0 % — SIGNIFICANT CHANGE UP (ref 0–6)
GAS PNL BLDV: 126 MMOL/L — LOW (ref 136–146)
GAS PNL BLDV: 127 MMOL/L — LOW (ref 136–146)
GLUCOSE BLDV-MCNC: 152 MG/DL — HIGH (ref 70–99)
GLUCOSE BLDV-MCNC: 260 MG/DL — HIGH (ref 70–99)
GLUCOSE SERPL-MCNC: 163 MG/DL — HIGH (ref 70–99)
HCO3 BLDV-SCNC: 23 MMOL/L — SIGNIFICANT CHANGE UP (ref 20–27)
HCO3 BLDV-SCNC: 26 MMOL/L — SIGNIFICANT CHANGE UP (ref 20–27)
HCT VFR BLD CALC: 39.4 % — SIGNIFICANT CHANGE UP (ref 39–50)
HCT VFR BLDV CALC: 40 % — SIGNIFICANT CHANGE UP (ref 39–51)
HCT VFR BLDV CALC: 41.3 % — SIGNIFICANT CHANGE UP (ref 39–51)
HGB BLD-MCNC: 13 G/DL — SIGNIFICANT CHANGE UP (ref 13–17)
HGB BLDV-MCNC: 13 G/DL — SIGNIFICANT CHANGE UP (ref 13–17)
HGB BLDV-MCNC: 13.5 G/DL — SIGNIFICANT CHANGE UP (ref 13–17)
IMM GRANULOCYTES NFR BLD AUTO: 0.7 % — SIGNIFICANT CHANGE UP (ref 0–1.5)
LACTATE BLDV-MCNC: 1.5 MMOL/L — SIGNIFICANT CHANGE UP (ref 0.5–2)
LACTATE BLDV-MCNC: 3.8 MMOL/L — HIGH (ref 0.5–2)
LYMPHOCYTES # BLD AUTO: 0.84 K/UL — LOW (ref 1–3.3)
LYMPHOCYTES # BLD AUTO: 13.9 % — SIGNIFICANT CHANGE UP (ref 13–44)
LYMPHOCYTES NFR SPEC AUTO: 4.5 % — LOW (ref 13–44)
MACROCYTES BLD QL: SLIGHT — SIGNIFICANT CHANGE UP
MAGNESIUM SERPL-MCNC: 1.9 MG/DL — SIGNIFICANT CHANGE UP (ref 1.6–2.6)
MCHC RBC-ENTMCNC: 27.3 PG — SIGNIFICANT CHANGE UP (ref 27–34)
MCHC RBC-ENTMCNC: 33 % — SIGNIFICANT CHANGE UP (ref 32–36)
MCV RBC AUTO: 82.6 FL — SIGNIFICANT CHANGE UP (ref 80–100)
METAMYELOCYTES # FLD: 0 % — SIGNIFICANT CHANGE UP (ref 0–1)
MICROCYTES BLD QL: SIGNIFICANT CHANGE UP
MONOCYTES # BLD AUTO: 0.55 K/UL — SIGNIFICANT CHANGE UP (ref 0–0.9)
MONOCYTES NFR BLD AUTO: 9.1 % — SIGNIFICANT CHANGE UP (ref 2–14)
MONOCYTES NFR BLD: 9 % — SIGNIFICANT CHANGE UP (ref 2–9)
MYELOCYTES NFR BLD: 0 % — SIGNIFICANT CHANGE UP (ref 0–0)
NEUTROPHIL AB SER-ACNC: 68.5 % — SIGNIFICANT CHANGE UP (ref 43–77)
NEUTROPHILS # BLD AUTO: 4.45 K/UL — SIGNIFICANT CHANGE UP (ref 1.8–7.4)
NEUTROPHILS NFR BLD AUTO: 73.8 % — SIGNIFICANT CHANGE UP (ref 43–77)
NEUTS BAND # BLD: 17.1 % — HIGH (ref 0–6)
NRBC # FLD: 0 K/UL — SIGNIFICANT CHANGE UP (ref 0–0)
OTHER - HEMATOLOGY %: 0 — SIGNIFICANT CHANGE UP
OVALOCYTES BLD QL SMEAR: SLIGHT — SIGNIFICANT CHANGE UP
PCO2 BLDV: 38 MMHG — LOW (ref 41–51)
PCO2 BLDV: 48 MMHG — SIGNIFICANT CHANGE UP (ref 41–51)
PH BLDV: 7.33 PH — SIGNIFICANT CHANGE UP (ref 7.32–7.43)
PH BLDV: 7.45 PH — HIGH (ref 7.32–7.43)
PHOSPHATE SERPL-MCNC: 2.4 MG/DL — LOW (ref 2.5–4.5)
PLATELET # BLD AUTO: 200 K/UL — SIGNIFICANT CHANGE UP (ref 150–400)
PLATELET COUNT - ESTIMATE: NORMAL — SIGNIFICANT CHANGE UP
PMV BLD: 9.9 FL — SIGNIFICANT CHANGE UP (ref 7–13)
PO2 BLDV: 26 MMHG — LOW (ref 35–40)
PO2 BLDV: 41 MMHG — HIGH (ref 35–40)
POIKILOCYTOSIS BLD QL AUTO: SLIGHT — SIGNIFICANT CHANGE UP
POTASSIUM BLDV-SCNC: 3.9 MMOL/L — SIGNIFICANT CHANGE UP (ref 3.4–4.5)
POTASSIUM BLDV-SCNC: 4 MMOL/L — SIGNIFICANT CHANGE UP (ref 3.4–4.5)
POTASSIUM SERPL-MCNC: 4.1 MMOL/L — SIGNIFICANT CHANGE UP (ref 3.5–5.3)
POTASSIUM SERPL-SCNC: 4.1 MMOL/L — SIGNIFICANT CHANGE UP (ref 3.5–5.3)
PROMYELOCYTES # FLD: 0 % — SIGNIFICANT CHANGE UP (ref 0–0)
RBC # BLD: 4.77 M/UL — SIGNIFICANT CHANGE UP (ref 4.2–5.8)
RBC # FLD: 14.6 % — HIGH (ref 10.3–14.5)
SAO2 % BLDV: 41.3 % — LOW (ref 60–85)
SAO2 % BLDV: 76.8 % — SIGNIFICANT CHANGE UP (ref 60–85)
SMUDGE CELLS # BLD: PRESENT — SIGNIFICANT CHANGE UP
SODIUM SERPL-SCNC: 131 MMOL/L — LOW (ref 135–145)
SPECIMEN SOURCE: SIGNIFICANT CHANGE UP
VARIANT LYMPHS # BLD: 0.9 % — SIGNIFICANT CHANGE UP
WBC # BLD: 6.03 K/UL — SIGNIFICANT CHANGE UP (ref 3.8–10.5)
WBC # FLD AUTO: 6.03 K/UL — SIGNIFICANT CHANGE UP (ref 3.8–10.5)

## 2019-07-10 PROCEDURE — 73070 X-RAY EXAM OF ELBOW: CPT | Mod: 26,RT

## 2019-07-10 PROCEDURE — 73110 X-RAY EXAM OF WRIST: CPT | Mod: 26,RT

## 2019-07-10 PROCEDURE — 99233 SBSQ HOSP IP/OBS HIGH 50: CPT

## 2019-07-10 RX ORDER — METFORMIN HYDROCHLORIDE 850 MG/1
1 TABLET ORAL
Qty: 0 | Refills: 0 | DISCHARGE

## 2019-07-10 RX ORDER — ENOXAPARIN SODIUM 100 MG/ML
40 INJECTION SUBCUTANEOUS DAILY
Refills: 0 | Status: DISCONTINUED | OUTPATIENT
Start: 2019-07-10 | End: 2019-07-12

## 2019-07-10 RX ORDER — ASCORBIC ACID 60 MG
1 TABLET,CHEWABLE ORAL
Qty: 0 | Refills: 0 | DISCHARGE

## 2019-07-10 RX ORDER — DEXTROSE 50 % IN WATER 50 %
25 SYRINGE (ML) INTRAVENOUS ONCE
Refills: 0 | Status: DISCONTINUED | OUTPATIENT
Start: 2019-07-10 | End: 2019-07-12

## 2019-07-10 RX ORDER — CHOLECALCIFEROL (VITAMIN D3) 125 MCG
1 CAPSULE ORAL
Qty: 0 | Refills: 0 | DISCHARGE

## 2019-07-10 RX ORDER — MULTIVIT-MIN/FERROUS GLUCONATE 9 MG/15 ML
1 LIQUID (ML) ORAL
Qty: 0 | Refills: 0 | DISCHARGE

## 2019-07-10 RX ORDER — EMPAGLIFLOZIN 10 MG/1
1 TABLET, FILM COATED ORAL
Qty: 0 | Refills: 0 | DISCHARGE

## 2019-07-10 RX ORDER — SITAGLIPTIN 50 MG/1
1 TABLET, FILM COATED ORAL
Qty: 0 | Refills: 0 | DISCHARGE

## 2019-07-10 RX ORDER — ACETAMINOPHEN 500 MG
650 TABLET ORAL EVERY 6 HOURS
Refills: 0 | Status: DISCONTINUED | OUTPATIENT
Start: 2019-07-10 | End: 2019-07-12

## 2019-07-10 RX ORDER — CABERGOLINE 0.5 MG/1
0.5 TABLET ORAL
Qty: 0 | Refills: 0 | DISCHARGE

## 2019-07-10 RX ORDER — SIMVASTATIN 20 MG/1
10 TABLET, FILM COATED ORAL AT BEDTIME
Refills: 0 | Status: DISCONTINUED | OUTPATIENT
Start: 2019-07-10 | End: 2019-07-12

## 2019-07-10 RX ORDER — SODIUM CHLORIDE 9 MG/ML
1000 INJECTION INTRAMUSCULAR; INTRAVENOUS; SUBCUTANEOUS
Refills: 0 | Status: DISCONTINUED | OUTPATIENT
Start: 2019-07-10 | End: 2019-07-12

## 2019-07-10 RX ORDER — SIMVASTATIN 20 MG/1
1 TABLET, FILM COATED ORAL
Qty: 0 | Refills: 0 | DISCHARGE

## 2019-07-10 RX ORDER — GLIMEPIRIDE 1 MG
1 TABLET ORAL
Qty: 0 | Refills: 0 | DISCHARGE

## 2019-07-10 RX ORDER — INSULIN LISPRO 100/ML
VIAL (ML) SUBCUTANEOUS
Refills: 0 | Status: DISCONTINUED | OUTPATIENT
Start: 2019-07-10 | End: 2019-07-12

## 2019-07-10 RX ORDER — DEXTROSE 50 % IN WATER 50 %
12.5 SYRINGE (ML) INTRAVENOUS ONCE
Refills: 0 | Status: DISCONTINUED | OUTPATIENT
Start: 2019-07-10 | End: 2019-07-12

## 2019-07-10 RX ORDER — DEXTROSE 50 % IN WATER 50 %
15 SYRINGE (ML) INTRAVENOUS ONCE
Refills: 0 | Status: DISCONTINUED | OUTPATIENT
Start: 2019-07-10 | End: 2019-07-12

## 2019-07-10 RX ORDER — GLUCAGON INJECTION, SOLUTION 0.5 MG/.1ML
1 INJECTION, SOLUTION SUBCUTANEOUS ONCE
Refills: 0 | Status: DISCONTINUED | OUTPATIENT
Start: 2019-07-10 | End: 2019-07-12

## 2019-07-10 RX ORDER — SODIUM CHLORIDE 9 MG/ML
1000 INJECTION, SOLUTION INTRAVENOUS
Refills: 0 | Status: DISCONTINUED | OUTPATIENT
Start: 2019-07-10 | End: 2019-07-12

## 2019-07-10 RX ADMIN — Medication 4: at 18:26

## 2019-07-10 RX ADMIN — SODIUM CHLORIDE 75 MILLILITER(S): 9 INJECTION INTRAMUSCULAR; INTRAVENOUS; SUBCUTANEOUS at 03:50

## 2019-07-10 RX ADMIN — Medication 75 MILLIGRAM(S): at 00:52

## 2019-07-10 RX ADMIN — Medication 650 MILLIGRAM(S): at 20:28

## 2019-07-10 RX ADMIN — Medication 650 MILLIGRAM(S): at 16:53

## 2019-07-10 RX ADMIN — Medication 75 MILLIGRAM(S): at 08:39

## 2019-07-10 RX ADMIN — SODIUM CHLORIDE 75 MILLILITER(S): 9 INJECTION INTRAMUSCULAR; INTRAVENOUS; SUBCUTANEOUS at 08:39

## 2019-07-10 RX ADMIN — SIMVASTATIN 10 MILLIGRAM(S): 20 TABLET, FILM COATED ORAL at 23:48

## 2019-07-10 RX ADMIN — Medication 100 MILLIGRAM(S): at 16:53

## 2019-07-10 RX ADMIN — Medication 75 MILLIGRAM(S): at 18:26

## 2019-07-10 RX ADMIN — AZITHROMYCIN 250 MILLIGRAM(S): 500 TABLET, FILM COATED ORAL at 00:36

## 2019-07-10 RX ADMIN — Medication 100 MILLIGRAM(S): at 23:47

## 2019-07-10 NOTE — H&P ADULT - NSHPLABSRESULTS_GEN_ALL_CORE
.  LABS:                         13.0   6.03  )-----------( 200      ( 10 Jul 2019 07:40 )             39.4     07-10    131<L>  |  97<L>  |  10  ----------------------------<  163<H>  4.1   |  24  |  0.73    Ca    8.6      10 Jul 2019 07:40  Phos  2.4     07-10  Mg     1.9     07-10    TPro  7.7  /  Alb  4.4  /  TBili  0.3  /  DBili  x   /  AST  32  /  ALT  37  /  AlkPhos  56  07-      Urinalysis Basic - ( 2019 23:20 )    Color: LIGHT YELLOW / Appearance: CLEAR / S.019 / pH: 6.5  Gluc: >1000 / Ketone: SMALL  / Bili: NEGATIVE / Urobili: NORMAL   Blood: NEGATIVE / Protein: NEGATIVE / Nitrite: NEGATIVE   Leuk Esterase: NEGATIVE / RBC: x / WBC x   Sq Epi: x / Non Sq Epi: x / Bacteria: x                RADIOLOGY, EKG & ADDITIONAL TESTS: Reviewed.    < from: CT Head No Cont (19 @ 23:37) >    IMPRESSION:    No acute intracranial hemorrhage, mass effect or midline shift.     No displaced calvarial fracture or significant scalp hematoma.    If symptoms persists, consider short-term follow-up or MRI may be   obtained for further evaluation provided no MR contraindications.      SAMI KHAN M.D., RADIOLOGY RESIDENT  This document has been electronically signed.  BELL GASTON M.D., ATTENDING RADIOLOGIST  This document has been electronically signed. Jul 10 2019  1:10AM
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
## 2019-07-10 NOTE — ED ADULT NURSE REASSESSMENT NOTE - NS ED NURSE REASSESS COMMENT FT1
report received from Kinjal RN, pt a&Ox3, breathing even & unlabored, on contact precautions for + flu, fluids running as per MD order report received from Kinjal RN, pt a&Ox3, breathing even & unlabored, on contact precautions for + flu, fluids running as per MD order, no s/s of infiltration @ IV site.,  report given to floor by Kinjal GUPTA.

## 2019-07-10 NOTE — H&P ADULT - PROBLEM SELECTOR PLAN 4
Pt with pain of R wrist and R elbow 2/2 fall yesterday evening in ED. Pt cannot recall if he landed on wrist. Exam significant for edema of olecranon process w/TTP, wrist with minor TTP and limited flexion/extension due to pain, no obv deformity noted  -Xray R wrist and R elbow ordered

## 2019-07-10 NOTE — ED ADULT NURSE REASSESSMENT NOTE - NS ED NURSE REASSESS COMMENT FT1
pt express need to void urine, offered urinal and refused. assisted to restroom (within room) by RN and PCA. pt ambulatory but unsteady on feet at time. pt returned to stretcher, comfort and safety measures provided. IVF infusing well per MD order. PCA at bedside for enhanced supervision.

## 2019-07-10 NOTE — H&P ADULT - PROBLEM SELECTOR PLAN 7
Diabetic diet  Replete electrolytes Pt reports history of pituitary lesion for which he was on cabergoline, currently on hold. PMD monitors ?prolactin and advises pt when to take medication.

## 2019-07-10 NOTE — H&P ADULT - NSHPPHYSICALEXAM_GEN_ALL_CORE
.  VITAL SIGNS:  T(C): 37.6 (07-10-19 @ 11:50), Max: 39.3 (07-09-19 @ 20:19)  T(F): 99.7 (07-10-19 @ 11:50), Max: 102.7 (07-09-19 @ 20:19)  HR: 81 (07-10-19 @ 11:50) (81 - 101)  BP: 119/60 (07-10-19 @ 11:50) (119/60 - 209/92)  BP(mean): 89 (07-09-19 @ 20:19) (89 - 89)  RR: 18 (07-10-19 @ 11:50) (16 - 24)  SpO2: 99% (07-10-19 @ 11:50) (96% - 99%)  Wt(kg): --    PHYSICAL EXAM:    Constitutional: WDWN resting comfortably in bed; NAD  Head: NC/AT  Eyes: PERRL, EOMI, clear conjunctiva  ENT: no nasal discharge; uvula midline, no oropharyngeal erythema or exudates; dry MM  Neck: supple  Respiratory: CTA B/L; no W/R/R  Cardiac: +S1/S2; RRR; no M/R/G  Gastrointestinal: soft, NT/ND; no rebound or guarding; +BSx4  Back: spine midline, no bony tenderness or step-offs; no CVAT B/L  Extremities: WWP, no clubbing or cyanosis; no peripheral edema  Musculoskeletal: NROM x4; +edema and TTP of R olecranon process, mild TTP of medial right wrist   Vascular: 2+ radial, DP pulses B/L  Dermatologic: skin warm, dry and intact; no rashes, wounds, or scars  Neurologic: AAOx3; CNII-XII grossly intact; no focal deficits  Psychiatric: affect and characteristics of appearance, verbalizations, behaviors are appropriate

## 2019-07-10 NOTE — H&P ADULT - ASSESSMENT
73M w/PMH of DMII, hx of pituitary adenoma (intermittently on cabergoline per PMD) sent in by PMD's office for AMS and vomiting, admitted for severe sepsis 2/2 influenza AH3 and toxic metabolic encephalopathy now resolved.

## 2019-07-10 NOTE — H&P ADULT - HISTORY OF PRESENT ILLNESS
73M w/PMH of DM 73M w/PMH of DMII, pitiutary adenoma on cabergoline 73M w/PMH of DMII, hx of pituitary adenoma (intermittently on cabergoline per PMD) sent in by PMD's office for AMS and vomiting. Pt currently alert and oriented x3, back to baseline, however yesterday evening was placed on enhanced after fall in ED. Pt was noted to be agitated and oriented x2 at that time.   Currently, pt reports he has been feeling unwell since Monday, with nonproductive cough that started on Sunday. He reports subjective fever/chills, sore throat, and sneezing.   He was at family event over the weekend and his niece was noted to be coughing.   Admits to getting flu vaccine this year, denies every having the flu.     In ED, pt febrile to 102.7 orally, tachycardic 101.   Lactate 3.8, resolved after 2L NS bolus. Pt given Cef/Azithro and started on Tamiflu once RVP resulted in Influenza AH3. 73M w/PMH of DMII, hx of pituitary adenoma (intermittently on cabergoline per PMD) sent in by PMD's office for AMS and vomiting. Pt currently alert and oriented x3, back to baseline, however yesterday evening was placed on enhanced after fall in ED. Pt was noted to be agitated and oriented x2 at that time.   Currently, pt reports he has been feeling unwell since Monday, with nonproductive cough that started on Sunday. He reports subjective fever/chills, sore throat, and sneezing. Also w/poor PO intake since this past weekend.   He was at family event over the weekend and his niece was noted to be coughing.   Admits to getting flu vaccine this year, denies every having the flu.     In ED, pt febrile to 102.7 orally, tachycardic 101.   Lactate 3.8, resolved after 2L NS bolus. Pt given Cef/Azithro and started on Tamiflu once RVP resulted in Influenza AH3.

## 2019-07-10 NOTE — H&P ADULT - PROBLEM SELECTOR PLAN 6
Well controlled per patient. Home meds include Jardiance 10mg daily, Glucophage 1000mg daily, Glimepiride 4mg BID, Januvia 100mg daily.    -f/u A1c  -Hold PO meds   -ISS and FS for now

## 2019-07-10 NOTE — H&P ADULT - PROBLEM SELECTOR PLAN 1
Pt with fever, lactic acidosis now cleared, bandemia and TME in setting of influenza AH3  -s/p Ceftriaxone, Azithromycin, can hold off on Abx  -s/p 2L NS bolus, 30cc/kg fluids administered   -C/w Tamiflu x5 days  -f/u blood cultures  -Urine legionella ordered   -Supportive care with IVF NS 75cc/hr, Tylenol, Tessalon perle

## 2019-07-10 NOTE — H&P ADULT - NSICDXPASTMEDICALHX_GEN_ALL_CORE_FT
PAST MEDICAL HISTORY:  BPH (benign prostatic hypertrophy)     Carpal tunnel syndrome, left     Diabetes mellitus type II, controlled     Herniated disc, cervical hx of , corected with  sx- no limited ROM    HLD (hyperlipidemia) h/o of , now tx with diet , no meds    HTN (hypertension) h/o of , now tx with diet , no meds    Left wrist fracture hx of    Non-recurrent unilateral inguinal hernia without obstruction or gangrene     Other specific joint derangement of left hip, not elsewhere classified     Pituitary adenoma dx in 2014, MRI of brain done, pt is taking meds for hyperprolactinemia    Trigger finger right middle finger PAST MEDICAL HISTORY:  BPH (benign prostatic hypertrophy)     Carpal tunnel syndrome, left     Diabetes mellitus type II, controlled     Herniated disc, cervical hx of , corected with  sx- no limited ROM    HLD (hyperlipidemia)     HTN (hypertension) h/o of , now tx with diet , no meds    Left wrist fracture hx of    Non-recurrent unilateral inguinal hernia without obstruction or gangrene     Other specific joint derangement of left hip, not elsewhere classified     Pituitary adenoma dx in 2014, MRI of brain done, currently not on cabergoline per PMD    Trigger finger right middle finger

## 2019-07-11 LAB
ANION GAP SERPL CALC-SCNC: 13 MMO/L — SIGNIFICANT CHANGE UP (ref 7–14)
BACTERIA UR CULT: SIGNIFICANT CHANGE UP
BASOPHILS # BLD AUTO: 0.02 K/UL — SIGNIFICANT CHANGE UP (ref 0–0.2)
BASOPHILS NFR BLD AUTO: 0.5 % — SIGNIFICANT CHANGE UP (ref 0–2)
BUN SERPL-MCNC: 11 MG/DL — SIGNIFICANT CHANGE UP (ref 7–23)
CALCIUM SERPL-MCNC: 9.3 MG/DL — SIGNIFICANT CHANGE UP (ref 8.4–10.5)
CHLORIDE SERPL-SCNC: 96 MMOL/L — LOW (ref 98–107)
CO2 SERPL-SCNC: 24 MMOL/L — SIGNIFICANT CHANGE UP (ref 22–31)
CREAT SERPL-MCNC: 0.76 MG/DL — SIGNIFICANT CHANGE UP (ref 0.5–1.3)
EOSINOPHIL # BLD AUTO: 0 K/UL — SIGNIFICANT CHANGE UP (ref 0–0.5)
EOSINOPHIL NFR BLD AUTO: 0 % — SIGNIFICANT CHANGE UP (ref 0–6)
GLUCOSE SERPL-MCNC: 171 MG/DL — HIGH (ref 70–99)
HBA1C BLD-MCNC: 7.7 % — HIGH (ref 4–5.6)
HCT VFR BLD CALC: 44 % — SIGNIFICANT CHANGE UP (ref 39–50)
HCV AB S/CO SERPL IA: 0.06 S/CO — SIGNIFICANT CHANGE UP (ref 0–0.99)
HCV AB SERPL-IMP: SIGNIFICANT CHANGE UP
HGB BLD-MCNC: 14.1 G/DL — SIGNIFICANT CHANGE UP (ref 13–17)
IMM GRANULOCYTES NFR BLD AUTO: 0.5 % — SIGNIFICANT CHANGE UP (ref 0–1.5)
LYMPHOCYTES # BLD AUTO: 0.87 K/UL — LOW (ref 1–3.3)
LYMPHOCYTES # BLD AUTO: 22.2 % — SIGNIFICANT CHANGE UP (ref 13–44)
MAGNESIUM SERPL-MCNC: 2.1 MG/DL — SIGNIFICANT CHANGE UP (ref 1.6–2.6)
MCHC RBC-ENTMCNC: 26.6 PG — LOW (ref 27–34)
MCHC RBC-ENTMCNC: 32 % — SIGNIFICANT CHANGE UP (ref 32–36)
MCV RBC AUTO: 82.9 FL — SIGNIFICANT CHANGE UP (ref 80–100)
MONOCYTES # BLD AUTO: 0.59 K/UL — SIGNIFICANT CHANGE UP (ref 0–0.9)
MONOCYTES NFR BLD AUTO: 15.1 % — HIGH (ref 2–14)
NEUTROPHILS # BLD AUTO: 2.42 K/UL — SIGNIFICANT CHANGE UP (ref 1.8–7.4)
NEUTROPHILS NFR BLD AUTO: 61.7 % — SIGNIFICANT CHANGE UP (ref 43–77)
NRBC # FLD: 0.02 K/UL — SIGNIFICANT CHANGE UP (ref 0–0)
PHOSPHATE SERPL-MCNC: 2 MG/DL — LOW (ref 2.5–4.5)
PLATELET # BLD AUTO: 220 K/UL — SIGNIFICANT CHANGE UP (ref 150–400)
PMV BLD: 9.5 FL — SIGNIFICANT CHANGE UP (ref 7–13)
POTASSIUM SERPL-MCNC: 4.8 MMOL/L — SIGNIFICANT CHANGE UP (ref 3.5–5.3)
POTASSIUM SERPL-SCNC: 4.8 MMOL/L — SIGNIFICANT CHANGE UP (ref 3.5–5.3)
RBC # BLD: 5.31 M/UL — SIGNIFICANT CHANGE UP (ref 4.2–5.8)
RBC # FLD: 14.6 % — HIGH (ref 10.3–14.5)
SODIUM SERPL-SCNC: 133 MMOL/L — LOW (ref 135–145)
WBC # BLD: 3.92 K/UL — SIGNIFICANT CHANGE UP (ref 3.8–10.5)
WBC # FLD AUTO: 3.92 K/UL — SIGNIFICANT CHANGE UP (ref 3.8–10.5)

## 2019-07-11 PROCEDURE — 99233 SBSQ HOSP IP/OBS HIGH 50: CPT

## 2019-07-11 RX ORDER — POTASSIUM PHOSPHATE, MONOBASIC POTASSIUM PHOSPHATE, DIBASIC 236; 224 MG/ML; MG/ML
15 INJECTION, SOLUTION INTRAVENOUS ONCE
Refills: 0 | Status: COMPLETED | OUTPATIENT
Start: 2019-07-11 | End: 2019-07-11

## 2019-07-11 RX ADMIN — Medication 75 MILLIGRAM(S): at 18:54

## 2019-07-11 RX ADMIN — SODIUM CHLORIDE 75 MILLILITER(S): 9 INJECTION INTRAMUSCULAR; INTRAVENOUS; SUBCUTANEOUS at 18:53

## 2019-07-11 RX ADMIN — ENOXAPARIN SODIUM 40 MILLIGRAM(S): 100 INJECTION SUBCUTANEOUS at 12:26

## 2019-07-11 RX ADMIN — Medication 650 MILLIGRAM(S): at 09:08

## 2019-07-11 RX ADMIN — Medication 100 MILLIGRAM(S): at 13:20

## 2019-07-11 RX ADMIN — POTASSIUM PHOSPHATE, MONOBASIC POTASSIUM PHOSPHATE, DIBASIC 62.5 MILLIMOLE(S): 236; 224 INJECTION, SOLUTION INTRAVENOUS at 12:22

## 2019-07-11 RX ADMIN — Medication 100 MILLIGRAM(S): at 07:28

## 2019-07-11 RX ADMIN — Medication 2: at 22:00

## 2019-07-11 RX ADMIN — Medication 75 MILLIGRAM(S): at 07:28

## 2019-07-11 RX ADMIN — Medication 2: at 13:18

## 2019-07-11 RX ADMIN — SIMVASTATIN 10 MILLIGRAM(S): 20 TABLET, FILM COATED ORAL at 22:00

## 2019-07-11 RX ADMIN — Medication 100 MILLIGRAM(S): at 22:00

## 2019-07-11 RX ADMIN — Medication 2: at 18:52

## 2019-07-11 NOTE — PROGRESS NOTE ADULT - PROBLEM SELECTOR PLAN 4
Pt with pain of R wrist and R elbow 2/2 fall yesterday evening in ED. Pt cannot recall if he landed on wrist. Exam significant for edema of olecranon process w/TTP, wrist with minor TTP and limited flexion/extension due to pain, no obv deformity noted  -Xray R wrist and R elbow didn't show fractures. Pt with pain of R wrist and R elbow 2/2 fall yesterday evening in ED. Pt cannot recall if he landed on wrist. Wrist pain improved with resolved swelling.  no obv deformity noted  -Xray R wrist and R elbow didn't show fractures.

## 2019-07-11 NOTE — PROGRESS NOTE ADULT - PROBLEM SELECTOR PLAN 1
Pt with fever, lactic acidosis now cleared, bandemia and TME in setting of influenza AH3  -s/p Ceftriaxone, Azithromycin, now watching off abx since influenza positive  -s/p 2L NS bolus, 30cc/kg fluids administered   -C/w Tamiflu x5 days  -f/u blood cultures  -Urine legionella ordered   -Supportive care with IVF NS 75cc/hr, Tylenol, Tessalon perle

## 2019-07-11 NOTE — PHYSICAL THERAPY INITIAL EVALUATION ADULT - PERTINENT HX OF CURRENT PROBLEM, REHAB EVAL
Patient is 73 year old male admitted with history of DM2, pituitary adenoma, presents with AMS, vomiting fall on knees.

## 2019-07-11 NOTE — PROVIDER CONTACT NOTE (OTHER) - BACKGROUND
Patient admitted for influenza due to unidentified influenza virus with other respiratory manifestation

## 2019-07-11 NOTE — PROGRESS NOTE ADULT - SUBJECTIVE AND OBJECTIVE BOX
Patient is a 73y old  Male who presents with a chief complaint of generalized weakness, fever, vomiting (10 Jul 2019 12:59)      SUBJECTIVE / OVERNIGHT EVENTS:    MEDICATIONS  (STANDING):  benzonatate 100 milliGRAM(s) Oral three times a day  dextrose 5%. 1000 milliLiter(s) (50 mL/Hr) IV Continuous <Continuous>  dextrose 50% Injectable 12.5 Gram(s) IV Push once  dextrose 50% Injectable 25 Gram(s) IV Push once  dextrose 50% Injectable 25 Gram(s) IV Push once  enoxaparin Injectable 40 milliGRAM(s) SubCutaneous daily  insulin lispro (HumaLOG) corrective regimen sliding scale   SubCutaneous Before meals and at bedtime  oseltamivir 75 milliGRAM(s) Oral two times a day  simvastatin 10 milliGRAM(s) Oral at bedtime  sodium chloride 0.9%. 1000 milliLiter(s) (75 mL/Hr) IV Continuous <Continuous>    MEDICATIONS  (PRN):  acetaminophen   Tablet .. 650 milliGRAM(s) Oral every 6 hours PRN Temp greater or equal to 38C (100.4F)  dextrose 40% Gel 15 Gram(s) Oral once PRN Blood Glucose LESS THAN 70 milliGRAM(s)/deciliter  glucagon  Injectable 1 milliGRAM(s) IntraMuscular once PRN Glucose LESS THAN 70 milligrams/deciliter      Vital Signs Last 24 Hrs  T(C): 36.9 (2019 10:35), Max: 39.2 (10 Jul 2019 16:52)  T(F): 98.4 (2019 10:35), Max: 102.5 (10 Jul 2019 16:52)  HR: 79 (2019 10:35) (79 - 91)  BP: 121/79 (2019 10:35) (119/60 - 168/90)  BP(mean): --  RR: 17 (2019 10:35) (17 - 20)  SpO2: 97% (2019 10:35) (91% - 100%)  CAPILLARY BLOOD GLUCOSE      POCT Blood Glucose.: 150 mg/dL (2019 09:06)  POCT Blood Glucose.: 111 mg/dL (10 Jul 2019 23:07)  POCT Blood Glucose.: 177 mg/dL (10 Jul 2019 19:11)  POCT Blood Glucose.: 229 mg/dL (10 Jul 2019 18:20)    I&O's Summary      PHYSICAL EXAM:  GENERAL: NAD, well-developed  HEAD:  Atraumatic, Normocephalic  EYES: EOMI, PERRLA, conjunctiva and sclera clear  NECK: Supple, No JVD  CHEST/LUNG: Clear to auscultation bilaterally; No wheeze  HEART: Regular rate and rhythm; No murmurs, rubs, or gallops  ABDOMEN: Soft, Nontender, Nondistended; Bowel sounds present  EXTREMITIES:  2+ Peripheral Pulses, No clubbing, cyanosis, or edema  PSYCH: AAOx3  NEUROLOGY: non-focal  SKIN: No rashes or lesions    LABS:                        14.1   3.92  )-----------( 220      ( 2019 07:28 )             44.0     07-11    133<L>  |  96<L>  |  11  ----------------------------<  171<H>  4.8   |  24  |  0.76    Ca    9.3      2019 07:28  Phos  2.0     07-11  Mg     2.1     07-11    TPro  7.7  /  Alb  4.4  /  TBili  0.3  /  DBili  x   /  AST  32  /  ALT  37  /  AlkPhos  56  07-09          Urinalysis Basic - ( 2019 23:20 )    Color: LIGHT YELLOW / Appearance: CLEAR / S.019 / pH: 6.5  Gluc: >1000 / Ketone: SMALL  / Bili: NEGATIVE / Urobili: NORMAL   Blood: NEGATIVE / Protein: NEGATIVE / Nitrite: NEGATIVE   Leuk Esterase: NEGATIVE / RBC: x / WBC x   Sq Epi: x / Non Sq Epi: x / Bacteria: x        RADIOLOGY & ADDITIONAL TESTS:    Imaging Personally Reviewed: < from: Xray Wrist 3 Views, Right (07.10.19 @ 15:45) >  IMPRESSION:  Dorsally shifted appearing distal ulna relative to radius on the lateral   view, although this could be due to suboptimal positioning, correlate for   distal radioulnar joint instability.    Otherwise intact and normally aligned remaining osseous structures and   articulations with preserved joint spaces and no joint margin erosions.    No discrete lytic or blastic lesions.    < end of copied text >      Consultant(s) Notes Reviewed:      Care Discussed with Consultants/Other Providers: Patient is a 73y old  Male who presents with a chief complaint of generalized weakness, fever, vomiting (10 Jul 2019 12:59)      SUBJECTIVE / OVERNIGHT EVENTS:  Patient has no new complaints. He c/o cough. No pain in right wrist. Denies cp, SOB, abdominal pain, N/V/D     MEDICATIONS  (STANDING):  benzonatate 100 milliGRAM(s) Oral three times a day  dextrose 5%. 1000 milliLiter(s) (50 mL/Hr) IV Continuous <Continuous>  dextrose 50% Injectable 12.5 Gram(s) IV Push once  dextrose 50% Injectable 25 Gram(s) IV Push once  dextrose 50% Injectable 25 Gram(s) IV Push once  enoxaparin Injectable 40 milliGRAM(s) SubCutaneous daily  insulin lispro (HumaLOG) corrective regimen sliding scale   SubCutaneous Before meals and at bedtime  oseltamivir 75 milliGRAM(s) Oral two times a day  simvastatin 10 milliGRAM(s) Oral at bedtime  sodium chloride 0.9%. 1000 milliLiter(s) (75 mL/Hr) IV Continuous <Continuous>    MEDICATIONS  (PRN):  acetaminophen   Tablet .. 650 milliGRAM(s) Oral every 6 hours PRN Temp greater or equal to 38C (100.4F)  dextrose 40% Gel 15 Gram(s) Oral once PRN Blood Glucose LESS THAN 70 milliGRAM(s)/deciliter  glucagon  Injectable 1 milliGRAM(s) IntraMuscular once PRN Glucose LESS THAN 70 milligrams/deciliter      Vital Signs Last 24 Hrs  T(C): 36.9 (2019 10:35), Max: 39.2 (10 Jul 2019 16:52)  T(F): 98.4 (2019 10:35), Max: 102.5 (10 Jul 2019 16:52)  HR: 79 (2019 10:35) (79 - 91)  BP: 121/79 (2019 10:35) (119/60 - 168/90)  BP(mean): --  RR: 17 (2019 10:35) (17 - 20)  SpO2: 97% (2019 10:35) (91% - 100%)  CAPILLARY BLOOD GLUCOSE      POCT Blood Glucose.: 150 mg/dL (2019 09:06)  POCT Blood Glucose.: 111 mg/dL (10 Jul 2019 23:07)  POCT Blood Glucose.: 177 mg/dL (10 Jul 2019 19:11)  POCT Blood Glucose.: 229 mg/dL (10 Jul 2019 18:20)    I&O's Summary      PHYSICAL EXAM:  GENERAL: NAD, well-developed  HEAD:  Atraumatic, Normocephalic  EYES: EOMI, PERRLA, conjunctiva and sclera clear  NECK: Supple, No JVD  CHEST/LUNG: Clear to auscultation bilaterally; No wheeze  HEART: Regular rate and rhythm; No murmurs, rubs, or gallops  ABDOMEN: Soft, Nontender, Nondistended; Bowel sounds present  EXTREMITIES:  2+ Peripheral Pulses, No clubbing, cyanosis, or edema  PSYCH: AAOx3  NEUROLOGY: non-focal  SKIN: No rashes or lesions    LABS:                        14.1   3.92  )-----------( 220      ( 2019 07:28 )             44.0     07-11    133<L>  |  96<L>  |  11  ----------------------------<  171<H>  4.8   |  24  |  0.76    Ca    9.3      2019 07:28  Phos  2.0     07-11  Mg     2.1     07-11    TPro  7.7  /  Alb  4.4  /  TBili  0.3  /  DBili  x   /  AST  32  /  ALT  37  /  AlkPhos  56  07-09          Urinalysis Basic - ( 2019 23:20 )    Color: LIGHT YELLOW / Appearance: CLEAR / S.019 / pH: 6.5  Gluc: >1000 / Ketone: SMALL  / Bili: NEGATIVE / Urobili: NORMAL   Blood: NEGATIVE / Protein: NEGATIVE / Nitrite: NEGATIVE   Leuk Esterase: NEGATIVE / RBC: x / WBC x   Sq Epi: x / Non Sq Epi: x / Bacteria: x        RADIOLOGY & ADDITIONAL TESTS:    Imaging Personally Reviewed: < from: Xray Wrist 3 Views, Right (07.10.19 @ 15:45) >  IMPRESSION:  Dorsally shifted appearing distal ulna relative to radius on the lateral   view, although this could be due to suboptimal positioning, correlate for   distal radioulnar joint instability.    Otherwise intact and normally aligned remaining osseous structures and   articulations with preserved joint spaces and no joint margin erosions.    No discrete lytic or blastic lesions.    < end of copied text >      Consultant(s) Notes Reviewed:      Care Discussed with Consultants/Other Providers:

## 2019-07-11 NOTE — PROGRESS NOTE ADULT - PROBLEM SELECTOR PLAN 8
Verbal order and read back per [address] [facility] [address], NP  Patient is within therapeutic range  Continue Coumadin 5mg daily except 2.5mg on Tues and Thurs   Recheck 1 week   This has been fully explained to the patient, who indicates understanding. Diabetic diet  Replete electrolytes Lovenox 40mg

## 2019-07-11 NOTE — PROGRESS NOTE ADULT - PROBLEM SELECTOR PLAN 7
Pt reports history of pituitary lesion for which he was on cabergoline, currently on hold. PMD monitors ?prolactin and advises pt when to take medication.

## 2019-07-11 NOTE — PROGRESS NOTE ADULT - ASSESSMENT
73 y.o. Male w/ Hx DMII, hx of pituitary adenoma (intermittently on cabergoline per PMD) sent in by PMD's office for AMS and vomiting, admitted for severe sepsis 2/2 influenza AH3 and toxic metabolic encephalopathy.

## 2019-07-12 ENCOUNTER — TRANSCRIPTION ENCOUNTER (OUTPATIENT)
Age: 74
End: 2019-07-12

## 2019-07-12 VITALS
TEMPERATURE: 98 F | HEART RATE: 74 BPM | SYSTOLIC BLOOD PRESSURE: 121 MMHG | RESPIRATION RATE: 18 BRPM | OXYGEN SATURATION: 97 % | DIASTOLIC BLOOD PRESSURE: 79 MMHG

## 2019-07-12 LAB
ANION GAP SERPL CALC-SCNC: 10 MMO/L — SIGNIFICANT CHANGE UP (ref 7–14)
BASOPHILS # BLD AUTO: 0.01 K/UL — SIGNIFICANT CHANGE UP (ref 0–0.2)
BASOPHILS NFR BLD AUTO: 0.3 % — SIGNIFICANT CHANGE UP (ref 0–2)
BUN SERPL-MCNC: 13 MG/DL — SIGNIFICANT CHANGE UP (ref 7–23)
CALCIUM SERPL-MCNC: 8.8 MG/DL — SIGNIFICANT CHANGE UP (ref 8.4–10.5)
CHLORIDE SERPL-SCNC: 101 MMOL/L — SIGNIFICANT CHANGE UP (ref 98–107)
CO2 SERPL-SCNC: 26 MMOL/L — SIGNIFICANT CHANGE UP (ref 22–31)
CREAT SERPL-MCNC: 0.7 MG/DL — SIGNIFICANT CHANGE UP (ref 0.5–1.3)
EOSINOPHIL # BLD AUTO: 0.03 K/UL — SIGNIFICANT CHANGE UP (ref 0–0.5)
EOSINOPHIL NFR BLD AUTO: 0.9 % — SIGNIFICANT CHANGE UP (ref 0–6)
GLUCOSE SERPL-MCNC: 245 MG/DL — HIGH (ref 70–99)
HCT VFR BLD CALC: 38.8 % — LOW (ref 39–50)
HGB BLD-MCNC: 12.7 G/DL — LOW (ref 13–17)
IMM GRANULOCYTES NFR BLD AUTO: 0.3 % — SIGNIFICANT CHANGE UP (ref 0–1.5)
L PNEUMO AG UR QL: NEGATIVE — SIGNIFICANT CHANGE UP
LYMPHOCYTES # BLD AUTO: 1 K/UL — SIGNIFICANT CHANGE UP (ref 1–3.3)
LYMPHOCYTES # BLD AUTO: 28.7 % — SIGNIFICANT CHANGE UP (ref 13–44)
MANUAL SMEAR VERIFICATION: SIGNIFICANT CHANGE UP
MCHC RBC-ENTMCNC: 27 PG — SIGNIFICANT CHANGE UP (ref 27–34)
MCHC RBC-ENTMCNC: 32.7 % — SIGNIFICANT CHANGE UP (ref 32–36)
MCV RBC AUTO: 82.6 FL — SIGNIFICANT CHANGE UP (ref 80–100)
MONOCYTES # BLD AUTO: 0.39 K/UL — SIGNIFICANT CHANGE UP (ref 0–0.9)
MONOCYTES NFR BLD AUTO: 11.2 % — SIGNIFICANT CHANGE UP (ref 2–14)
NEUTROPHILS # BLD AUTO: 2.05 K/UL — SIGNIFICANT CHANGE UP (ref 1.8–7.4)
NEUTROPHILS NFR BLD AUTO: 58.6 % — SIGNIFICANT CHANGE UP (ref 43–77)
NRBC # FLD: 0 K/UL — SIGNIFICANT CHANGE UP (ref 0–0)
PLATELET # BLD AUTO: 226 K/UL — SIGNIFICANT CHANGE UP (ref 150–400)
PMV BLD: 9.7 FL — SIGNIFICANT CHANGE UP (ref 7–13)
POTASSIUM SERPL-MCNC: 4.3 MMOL/L — SIGNIFICANT CHANGE UP (ref 3.5–5.3)
POTASSIUM SERPL-SCNC: 4.3 MMOL/L — SIGNIFICANT CHANGE UP (ref 3.5–5.3)
RBC # BLD: 4.7 M/UL — SIGNIFICANT CHANGE UP (ref 4.2–5.8)
RBC # FLD: 14.8 % — HIGH (ref 10.3–14.5)
SODIUM SERPL-SCNC: 137 MMOL/L — SIGNIFICANT CHANGE UP (ref 135–145)
WBC # BLD: 3.49 K/UL — LOW (ref 3.8–10.5)
WBC # FLD AUTO: 3.49 K/UL — LOW (ref 3.8–10.5)

## 2019-07-12 PROCEDURE — 99239 HOSP IP/OBS DSCHRG MGMT >30: CPT

## 2019-07-12 RX ADMIN — Medication 75 MILLIGRAM(S): at 06:40

## 2019-07-12 RX ADMIN — Medication 2: at 09:37

## 2019-07-12 RX ADMIN — Medication 100 MILLIGRAM(S): at 06:40

## 2019-07-12 NOTE — DISCHARGE NOTE NURSING/CASE MANAGEMENT/SOCIAL WORK - NSDCPNINST_GEN_ALL_CORE
- s/p Ceftriaxone, Azithromycin in ER- watching off abx since influenza positive  - Tamiflu x5 days  - blood cultures NTD  - sepsis resolved at the time of d/c

## 2019-07-12 NOTE — PROGRESS NOTE ADULT - PROBLEM SELECTOR PLAN 6
Well controlled per patient. Home meds include Jardiance 10mg daily, Glucophage 1000mg daily, Glimepiride 4mg BID, Januvia 100mg daily.    -f/u A1c  -Hold PO meds   -ISS and FS for now Well controlled per patient. Home meds include Jardiance 10mg daily, Glucophage 1000mg daily, Glimepiride 4mg BID, Januvia 100mg daily.    -HbA1c 7.7  -Hold PO meds   -ISS and FS for now

## 2019-07-12 NOTE — DISCHARGE NOTE PROVIDER - HOSPITAL COURSE
73 y.o. Male w/ Hx DMII, hx of pituitary adenoma (intermittently on cabergoline per PMD) sent in by PMD's office for AMS and vomiting. Admitted for severe sepsis 2/2 influenza AH3 and toxic metabolic encephalopathy which has now resolved.         Severe sepsis in setting of influenza AH3    - s/p Ceftriaxone, Azithromycin in ER- watching off abx since influenza positive    - Tamiflu x5 days    - blood cultures NTD    - sepsis resolved at the time of d/c             Toxic metabolic encephalopathy 2/2 sepsis - now resolved     - CTH negative        Hyponatremia - resolved with IVFs            Wrist pain, acute, right    - pain of R wrist and R elbow 2/2 fall     - Pt cannot recall if he landed on wrist    - no obvious deformity noted    - Wrist pain resolved    - -Xray R wrist and R elbow didn't show fractures        Diabetes mellitus    - HbA1c 7.7    - Hold PO meds     - ISS and FS for now while in hospital and resume PO meds on d/c         Pituitary adenoma    - Pt reports history of pituitary lesion for which he was on cabergoline, currently on hold    - f/u as outpatient         d/c home

## 2019-07-12 NOTE — DISCHARGE NOTE PROVIDER - NSDCCPCAREPLAN_GEN_ALL_CORE_FT
PRINCIPAL DISCHARGE DIAGNOSIS  Diagnosis: Severe sepsis  Assessment and Plan of Treatment: Secondary to influenza.   Complete tamiflu as prescribed. Your prescription was sent to [facility].      SECONDARY DISCHARGE DIAGNOSES  Diagnosis: Wrist pain, acute, right  Assessment and Plan of Treatment: Xrays did not show any fracture.    Diagnosis: Pituitary adenoma  Assessment and Plan of Treatment: Follow up with your primary care physician for continued care.    Diagnosis: Diabetes mellitus  Assessment and Plan of Treatment: Your hgba1c is 7.7. Continue your home medications and follow up with your primary care physician for continued care.

## 2019-07-12 NOTE — DISCHARGE NOTE PROVIDER - CARE PROVIDER_API CALL
Silvano Olivares (DO)  Family Medicine  [address] [place] [address]  Phone: [phone]  Fax: [phone]  Follow Up Time:

## 2019-07-12 NOTE — PROGRESS NOTE ADULT - SUBJECTIVE AND OBJECTIVE BOX
Patient is a 73y old  Male who presents with a chief complaint of generalized weakness, fever, vomiting (11 Jul 2019 11:11)      SUBJECTIVE / OVERNIGHT EVENTS:    MEDICATIONS  (STANDING):  benzonatate 100 milliGRAM(s) Oral three times a day  dextrose 5%. 1000 milliLiter(s) (50 mL/Hr) IV Continuous <Continuous>  dextrose 50% Injectable 12.5 Gram(s) IV Push once  dextrose 50% Injectable 25 Gram(s) IV Push once  dextrose 50% Injectable 25 Gram(s) IV Push once  enoxaparin Injectable 40 milliGRAM(s) SubCutaneous daily  insulin lispro (HumaLOG) corrective regimen sliding scale   SubCutaneous Before meals and at bedtime  oseltamivir 75 milliGRAM(s) Oral two times a day  simvastatin 10 milliGRAM(s) Oral at bedtime  sodium chloride 0.9%. 1000 milliLiter(s) (75 mL/Hr) IV Continuous <Continuous>    MEDICATIONS  (PRN):  acetaminophen   Tablet .. 650 milliGRAM(s) Oral every 6 hours PRN Temp greater or equal to 38C (100.4F)  dextrose 40% Gel 15 Gram(s) Oral once PRN Blood Glucose LESS THAN 70 milliGRAM(s)/deciliter  glucagon  Injectable 1 milliGRAM(s) IntraMuscular once PRN Glucose LESS THAN 70 milligrams/deciliter      Vital Signs Last 24 Hrs  T(C): 36.6 (12 Jul 2019 06:39), Max: 36.8 (11 Jul 2019 21:36)  T(F): 97.9 (12 Jul 2019 06:39), Max: 98.3 (11 Jul 2019 21:36)  HR: 74 (12 Jul 2019 06:39) (71 - 77)  BP: 121/79 (12 Jul 2019 06:39) (121/79 - 157/89)  BP(mean): --  RR: 18 (12 Jul 2019 06:39) (16 - 18)  SpO2: 97% (12 Jul 2019 06:39) (97% - 100%)  CAPILLARY BLOOD GLUCOSE      POCT Blood Glucose.: 162 mg/dL (12 Jul 2019 09:06)  POCT Blood Glucose.: 158 mg/dL (11 Jul 2019 21:34)  POCT Blood Glucose.: 165 mg/dL (11 Jul 2019 18:14)  POCT Blood Glucose.: 178 mg/dL (11 Jul 2019 12:34)    I&O's Summary    11 Jul 2019 07:01  -  12 Jul 2019 07:00  --------------------------------------------------------  IN: 0 mL / OUT: 930 mL / NET: -930 mL    12 Jul 2019 07:01  -  12 Jul 2019 10:59  --------------------------------------------------------  IN: 225 mL / OUT: 200 mL / NET: 25 mL        PHYSICAL EXAM:  GENERAL: NAD, well-developed  HEAD:  Atraumatic, Normocephalic  EYES: EOMI, PERRLA, conjunctiva and sclera clear  NECK: Supple, No JVD  CHEST/LUNG: Clear to auscultation bilaterally; No wheeze  HEART: Regular rate and rhythm; No murmurs, rubs, or gallops  ABDOMEN: Soft, Nontender, Nondistended; Bowel sounds present  EXTREMITIES:  2+ Peripheral Pulses, No clubbing, cyanosis, or edema  PSYCH: AAOx3  NEUROLOGY: non-focal  SKIN: No rashes or lesions    LABS:                        12.7   3.49  )-----------( 226      ( 12 Jul 2019 07:08 )             38.8     07-12    137  |  101  |  13  ----------------------------<  245<H>  4.3   |  26  |  0.70    Ca    8.8      12 Jul 2019 07:08  Phos  2.0     07-11  Mg     2.1     07-11                RADIOLOGY & ADDITIONAL TESTS:    Imaging Personally Reviewed:    Consultant(s) Notes Reviewed:      Care Discussed with Consultants/Other Providers: Patient is a 73y old  Male who presents with a chief complaint of generalized weakness, fever, vomiting (11 Jul 2019 11:11)      SUBJECTIVE / OVERNIGHT EVENTS:  Patient has no new complaints. Denies cp, SOB, abdominal pain, N/V/D     MEDICATIONS  (STANDING):  benzonatate 100 milliGRAM(s) Oral three times a day  dextrose 5%. 1000 milliLiter(s) (50 mL/Hr) IV Continuous <Continuous>  dextrose 50% Injectable 12.5 Gram(s) IV Push once  dextrose 50% Injectable 25 Gram(s) IV Push once  dextrose 50% Injectable 25 Gram(s) IV Push once  enoxaparin Injectable 40 milliGRAM(s) SubCutaneous daily  insulin lispro (HumaLOG) corrective regimen sliding scale   SubCutaneous Before meals and at bedtime  oseltamivir 75 milliGRAM(s) Oral two times a day  simvastatin 10 milliGRAM(s) Oral at bedtime  sodium chloride 0.9%. 1000 milliLiter(s) (75 mL/Hr) IV Continuous <Continuous>    MEDICATIONS  (PRN):  acetaminophen   Tablet .. 650 milliGRAM(s) Oral every 6 hours PRN Temp greater or equal to 38C (100.4F)  dextrose 40% Gel 15 Gram(s) Oral once PRN Blood Glucose LESS THAN 70 milliGRAM(s)/deciliter  glucagon  Injectable 1 milliGRAM(s) IntraMuscular once PRN Glucose LESS THAN 70 milligrams/deciliter      Vital Signs Last 24 Hrs  T(C): 36.6 (12 Jul 2019 06:39), Max: 36.8 (11 Jul 2019 21:36)  T(F): 97.9 (12 Jul 2019 06:39), Max: 98.3 (11 Jul 2019 21:36)  HR: 74 (12 Jul 2019 06:39) (71 - 77)  BP: 121/79 (12 Jul 2019 06:39) (121/79 - 157/89)  BP(mean): --  RR: 18 (12 Jul 2019 06:39) (16 - 18)  SpO2: 97% (12 Jul 2019 06:39) (97% - 100%)  CAPILLARY BLOOD GLUCOSE      POCT Blood Glucose.: 162 mg/dL (12 Jul 2019 09:06)  POCT Blood Glucose.: 158 mg/dL (11 Jul 2019 21:34)  POCT Blood Glucose.: 165 mg/dL (11 Jul 2019 18:14)  POCT Blood Glucose.: 178 mg/dL (11 Jul 2019 12:34)    I&O's Summary    11 Jul 2019 07:01  -  12 Jul 2019 07:00  --------------------------------------------------------  IN: 0 mL / OUT: 930 mL / NET: -930 mL    12 Jul 2019 07:01  -  12 Jul 2019 10:59  --------------------------------------------------------  IN: 225 mL / OUT: 200 mL / NET: 25 mL        PHYSICAL EXAM:  GENERAL: NAD, well-developed  HEAD:  Atraumatic, Normocephalic  EYES: EOMI, PERRLA, conjunctiva and sclera clear  NECK: Supple, No JVD  CHEST/LUNG: Clear to auscultation bilaterally; No wheeze  HEART: Regular rate and rhythm; No murmurs, rubs, or gallops  ABDOMEN: Soft, Nontender, Nondistended; Bowel sounds present  EXTREMITIES:  2+ Peripheral Pulses, No clubbing, cyanosis, or edema  PSYCH: AAOx3  NEUROLOGY: non-focal  SKIN: No rashes or lesions    LABS:                        12.7   3.49  )-----------( 226      ( 12 Jul 2019 07:08 )             38.8     07-12    137  |  101  |  13  ----------------------------<  245<H>  4.3   |  26  |  0.70    Ca    8.8      12 Jul 2019 07:08  Phos  2.0     07-11  Mg     2.1     07-11                RADIOLOGY & ADDITIONAL TESTS:    Imaging Personally Reviewed:    Consultant(s) Notes Reviewed:      Care Discussed with Consultants/Other Providers:

## 2019-07-12 NOTE — PROGRESS NOTE ADULT - PROBLEM SELECTOR PLAN 4
Pt with pain of R wrist and R elbow 2/2 fall yesterday evening in ED. Pt cannot recall if he landed on wrist. Wrist pain improved with resolved swelling.  no obv deformity noted  -Xray R wrist and R elbow didn't show fractures. Pt with pain of R wrist and R elbow 2/2 fall yesterday evening in ED. Pt cannot recall if he landed on wrist. Wrist pain resolved.  no obv deformity noted  -Xray R wrist and R elbow didn't show fractures.

## 2019-07-12 NOTE — DISCHARGE NOTE NURSING/CASE MANAGEMENT/SOCIAL WORK - NSDCDPATPORTLINK_GEN_ALL_CORE
You can access the [facility] Portal, offered by [facility], by registering with the following website: [email or web address]

## 2019-07-12 NOTE — PROGRESS NOTE ADULT - PROBLEM SELECTOR PLAN 1
Pt with fever, lactic acidosis now cleared, bandemia and TME in setting of influenza AH3  -s/p Ceftriaxone, Azithromycin, now watching off abx since influenza positive  -s/p 2L NS bolus, 30cc/kg fluids administered   -C/w Tamiflu x5 days  -f/u blood cultures  -Urine legionella ordered   -Supportive care with IVF NS 75cc/hr, Tylenol, Tessalon perle Pt with fever, lactic acidosis now cleared, bandemia and TME in setting of influenza AH3  -s/p Ceftriaxone, Azithromycin, now watching off abx since influenza positive  -s/p 2L NS bolus, 30cc/kg fluids administered   -C/w Tamiflu x5 days  -blood cultures NTD  -Supportive care with IVF NS 75cc/hr, Tylenol, Tessalon perle  sepsis resolved

## 2019-07-12 NOTE — PROGRESS NOTE ADULT - ASSESSMENT
73 y.o. Male w/ Hx DMII, hx of pituitary adenoma (intermittently on cabergoline per PMD) sent in by PMD's office for AMS and vomiting, admitted for severe sepsis 2/2 influenza AH3 and toxic metabolic encephalopathy. 73 y.o. Male w/ Hx DMII, hx of pituitary adenoma (intermittently on cabergoline per PMD) sent in by PMD's office for AMS and vomiting, admitted for severe sepsis 2/2 influenza AH3 and toxic metabolic encephalopathy now resolved. d/c 40 minutes.

## 2019-07-14 LAB
BACTERIA BLD CULT: SIGNIFICANT CHANGE UP
BACTERIA BLD CULT: SIGNIFICANT CHANGE UP

## 2021-04-02 NOTE — PROGRESS NOTE ADULT - PROVIDER SPECIALTY LIST ADULT
Hospitalist
Hospitalist
Body Location Override (Optional - Billing Will Still Be Based On Selected Body Map Location If Applicable): left posterior proximal humerus
Detail Level: Detailed
Add 12007 Cpt? (Important Note: In 2017 The Use Of 02452 Is Being Tracked By Cms To Determine Future Global Period Reimbursement For Global Periods): yes

## 2021-07-01 NOTE — H&P PST ADULT - REASON FOR ADMISSION
1st Attempt    This CHW called patient this day in regards to Longitudinal Care Management call  No answer, Left voicemail with my name and phone number so patient can contact me  " I have left hip pain"

## 2021-08-20 NOTE — ASU DISCHARGE PLAN (ADULT/PEDIATRIC). - ITEMS TO FOLLOWUP WITH YOUR PHYSICIAN'S
I am not sure what was the reason for the testing and also generally they order it there is any need and I do not see any mention in doctor's note recently from the last visit  The surgeon has to send me a request to order the labs and inform the patient to let the surgeons office know
Patient calling stating that her surgeon is asking if you can order lab work to rule out blood clotting disorder   please fax order to [phone] pre admission testing   [phone]   F# [phone]
Patient informed   Scheduled ov to discuss with patient
Follow-up with Dr. Freeman in ONE WEEK at [phone]

## 2022-01-07 NOTE — PHYSICAL THERAPY INITIAL EVALUATION ADULT - PRECAUTIONS/LIMITATIONS, REHAB EVAL
[facility], rn informed to call when ready for discharge.  No other needs identified at this time.   01/07/22 1409   Final Note   Assessment Type Final Discharge Note   Anticipated Discharge Disposition [facility]ospice[facility]   What phone number can be called within the next 1-3 days to see how you are doing after discharge? [phone]   Hospital Resources/Appts/Education Provided Post-Acute resouces added to AVS   Post-Acute Status   Post-Acute Authorization Hospice   Hospice Status Set-up Complete/Auth obtained   Discharge Delays (!) Ambulance Transport/Facility Transport      no known precautions/limitations

## 2022-01-21 NOTE — ED ADULT NURSE NOTE - NS ED NURSE REPORT GIVEN TO FT
Bed: A01-01  Expected date:   Expected time:   Means of arrival:   Comments:  Alfa Whitlock RN  01/20/22 2022
Pam

## 2023-02-06 ENCOUNTER — EMERGENCY (EMERGENCY)
Facility: HOSPITAL | Age: 78
LOS: 1 days | Discharge: HOME | End: 2023-02-06
Attending: STUDENT IN AN ORGANIZED HEALTH CARE EDUCATION/TRAINING PROGRAM | Admitting: STUDENT IN AN ORGANIZED HEALTH CARE EDUCATION/TRAINING PROGRAM
Payer: MEDICARE

## 2023-02-06 VITALS
TEMPERATURE: 98 F | SYSTOLIC BLOOD PRESSURE: 169 MMHG | RESPIRATION RATE: 18 BRPM | HEART RATE: 89 BPM | DIASTOLIC BLOOD PRESSURE: 102 MMHG | OXYGEN SATURATION: 98 %

## 2023-02-06 DIAGNOSIS — Z98.89 OTHER SPECIFIED POSTPROCEDURAL STATES: Chronic | ICD-10-CM

## 2023-02-06 DIAGNOSIS — M50.20 OTHER CERVICAL DISC DISPLACEMENT, UNSPECIFIED CERVICAL REGION: Chronic | ICD-10-CM

## 2023-02-06 DIAGNOSIS — M65.331 TRIGGER FINGER, RIGHT MIDDLE FINGER: Chronic | ICD-10-CM

## 2023-02-06 DIAGNOSIS — T14.8 OTHER INJURY OF UNSPECIFIED BODY REGION: Chronic | ICD-10-CM

## 2023-02-06 PROBLEM — D35.2 BENIGN NEOPLASM OF PITUITARY GLAND: Chronic | Status: ACTIVE | Noted: 2017-04-20

## 2023-02-06 PROCEDURE — 99284 EMERGENCY DEPT VISIT MOD MDM: CPT

## 2023-02-06 RX ORDER — CEPHALEXIN 500 MG
500 CAPSULE ORAL ONCE
Refills: 0 | Status: COMPLETED | OUTPATIENT
Start: 2023-02-06 | End: 2023-02-06

## 2023-02-06 RX ORDER — CEPHALEXIN 500 MG
1 CAPSULE ORAL
Qty: 21 | Refills: 0
Start: 2023-02-06 | End: 2023-02-12

## 2023-02-06 RX ADMIN — Medication 500 MILLIGRAM(S): at 12:49

## 2023-02-06 NOTE — ED PROVIDER NOTE - SKIN COLOR
erythematous area to lower lateral posterior chest with underlying swelling, scabbed over area of previous drainage, ttp; POCUS with cobblestoning but no overt collection/normal for race

## 2023-02-06 NOTE — ED ADULT NURSE NOTE - CAS ELECT INFOMATION PROVIDED
by ED Attending MD/DC instructions
Skin normal color for race, warm, dry and intact. No evidence of rash or abrasions

## 2023-02-06 NOTE — ED PROVIDER NOTE - OBJECTIVE STATEMENT
77M h/o DM, HTN p/w "cyst" x2 wks. States started as a "pimple" then progressively enlarged. Notes they have been able to intermittently squeeze pus from the area but then became more red and enlarged. Denies fever/chills. No sick contacts/travels. No cp, sob, difficulty breathing. Pt had gone to urgent care early today but sent to ED for further eval.

## 2023-02-06 NOTE — ED PROVIDER NOTE - NSICDXPASTMEDICALHX_GEN_ALL_CORE_FT
PAST MEDICAL HISTORY:  BPH (benign prostatic hypertrophy)     Carpal tunnel syndrome, left     Diabetes mellitus type II, controlled     Herniated disc, cervical hx of , corected with  sx- no limited ROM    HLD (hyperlipidemia)     HTN (hypertension) h/o of , now tx with diet , no meds    Left wrist fracture hx of    Non-recurrent unilateral inguinal hernia without obstruction or gangrene     Other specific joint derangement of left hip, not elsewhere classified     Pituitary adenoma dx in 2014, MRI of brain done, currently not on cabergoline per PMD    Trigger finger right middle finger

## 2023-02-06 NOTE — ED PROVIDER NOTE - CLINICAL SUMMARY MEDICAL DECISION MAKING FREE TEXT BOX
77M h/o DM, HTN, HLD p/w cellulits vs developing abscess - POCUS with cobblestoning but no overt collection warranting I&D. Will give keflex for cellulitis.  in triage. Follow-up instructioned discussed

## 2023-02-06 NOTE — ED PROVIDER NOTE - PATIENT PORTAL LINK FT
You can access the FollowMyHealth Patient Portal offered by [facility] by registering at the following website: [email or web address]. By joining [employer]’s FollowMyHealth portal, you will also be able to view your health information using other applications (apps) compatible with our system.

## 2023-02-06 NOTE — ED PROVIDER NOTE - NSFOLLOWUPINSTRUCTIONS_ED_ALL_ED_FT
98
Follow-up with your primary care doctor next week to assess progress on antibiotics  Complete full course of Keflex as prescribed  Apply warm compress to area 3-4 times a day  Return to ED if you experience worsening swelling or pain, fevers, or anything else concerning to you.

## 2023-02-06 NOTE — ED ADULT NURSE NOTE - OBJECTIVE STATEMENT
pt medicated at time of d/c. pt reports pimple like cyst to left side back. no current drainage at this time, mild redness noted. pt denies fevers. skin otherwise intact. pt given abx. ppw give by ED MD with discharge rx. pt ambulatory and stable for d/c with wife.

## 2023-04-25 NOTE — ASU PREOP CHECKLIST - BLOOD AVAILABLE
Subjective:   Patient ID:  Isaiah Dorsey Jr. is a 66 y.o. male who presents for follow-up of No chief complaint on file.  Isaiah Drosey Jr. is a 65 y.o. male who presents for follow-up of Pre-op Exam  Date of Operation:  07/18/2022      Operation:  Single vessel coronary artery bypass grafting  Left internal mammary artery to the distal left anterior descending artery  Endoscopic saphenous vein harvest from the left lower extremity   cardiac cath 7/22  The pre-procedure left ventricular end diastolic pressure was 25.  The Ost LAD to Prox LAD lesion was 90% stenosed.  The Mid LAD lesion was 70% stenosed.  The 1st Diag lesion was 90% stenosed.  The 2nd Diag lesion was 60% stenosed.  The RPAV lesion was 90% stenosed.  The Ost Cx to Prox Cx lesion was 99% stenosed.  The estimated blood loss was none.  Aortogram demonstrated no obvious dissection and a dilated ascending aorta.     The procedure log was documented by Documenter: Bay Alvarenga and verified by Bjorn Cheatham MD.     HPI:   Preop clearance for shoulder surgery  Currently on cardiac rehab  Patient was not exercising before  He is an   No chest pain, Orthopnea, PND of heart failure symptoms.   Denies palpitations or fluttering in the chest  Patient experienced Post op AF that spontaneously converted and was placed on amiodarone and xeralto. Patient stopped taking xeralto when he was asked to stop for colonoscopy and did not resume.   Can easily do 4 mets    Cardiac event monitor  Baseline rhythm was sinus bradycardia with normal intervals and an initial heart rate of 55 bpm.  There were no reported symptoms.  There was rare ectopy.  There were no atrial or ventricular arrhythmias.     HPI:   No chest pain, Orthopnea, PND of heart failure symptoms.   Denies palpitations or fluttering in the chest  Patient was not exercising before  He is an   Patient exercising and lifting weights.      Patient Active Problem List   Diagnosis     Diabetes mellitus type 2, uncontrolled, without complications    H/O splenectomy    Elevated platelet count    Polyp of transverse colon    Atherosclerotic heart disease of native coronary artery with unstable angina pectoris    Type 2 diabetes mellitus with hyperglycemia, without long-term current use of insulin    Dilatation of aorta    SIRS (systemic inflammatory response syndrome)    Heart failure with mid-range ejection fraction    S/P CABG (coronary artery bypass graft)    Non-STEMI (non-ST elevated myocardial infarction)    Acute blood loss anemia    Paroxysmal atrial fibrillation    RODRIGUEZ (obstructive sleep apnea)    Coronary artery disease involving coronary bypass graft of native heart without angina pectoris    Hypertension associated with diabetes    Hyperlipidemia associated with type 2 diabetes mellitus    Obesity hypoventilation syndrome    Tendonitis, Achilles, left    Benign prostatic hyperplasia with weak urinary stream    Erectile dysfunction    Impaired range of motion of right shoulder    Decreased strength of upper extremity     BP (!) 142/78 (BP Location: Right arm, Patient Position: Sitting)   Pulse 62   Ht 6' (1.829 m)   Wt 133 kg (293 lb 3.4 oz)   BMI 39.77 kg/m²   Body mass index is 39.77 kg/m².  CrCl cannot be calculated (Patient's most recent lab result is older than the maximum 7 days allowed.).    Lab Results   Component Value Date     03/23/2023    K 5.0 03/23/2023     03/23/2023    CO2 26 03/23/2023    BUN 16 03/23/2023    CREATININE 1.0 03/23/2023     (H) 03/23/2023    HGBA1C 6.2 (H) 03/23/2023    MG 1.9 07/23/2022    AST 19 03/23/2023    ALT 26 03/23/2023    ALBUMIN 3.8 03/23/2023    PROT 7.2 03/23/2023    BILITOT 0.4 03/23/2023    WBC 9.35 03/23/2023    HGB 12.7 (L) 03/23/2023    HCT 44.7 03/23/2023    HCT 35 (L) 07/19/2022    MCV 84 03/23/2023     (H) 03/23/2023    INR 1.1 07/21/2022    PSA 0.33 09/30/2022    TSH 2.840 07/13/2022    CHOL 89 (L)  07/28/2022    HDL 33 (L) 07/28/2022    LDLCALC 35.0 (L) 07/28/2022    TRIG 105 07/28/2022       Current Outpatient Medications   Medication Sig    acetaminophen (TYLENOL) 500 MG tablet Take 1,000 mg by mouth as needed for Pain.    aspirin 81 MG Chew Take 1 tablet (81 mg total) by mouth once daily.    atorvastatin (LIPITOR) 80 MG tablet Take 1 tablet (80 mg total) by mouth once daily.    dapagliflozin (FARXIGA) 10 mg tablet Take 1 tablet (10 mg total) by mouth before breakfast.    fluocinolone acetonide oiL (DERMOTIC OIL) 0.01 % Drop Place 3 drops into the right ear 2 (two) times daily.    JANUVIA 100 mg Tab Take 100 mg by mouth.    ketoconazole (NIZORAL) 2 % cream Apply topically 2 (two) times daily. Prn rash face    ketoconazole (NIZORAL) 2 % shampoo APPLY TOPICALLY EVERY DAY FOR FACE AND SCALP SOAKS    losartan (COZAAR) 25 MG tablet Take 1 tablet (25 mg total) by mouth once daily.    metFORMIN (GLUCOPHAGE) 1000 MG tablet Take 1 tablet (1,000 mg total) by mouth 2 (two) times daily with meals.    metoprolol succinate (TOPROL-XL) 100 MG 24 hr tablet Take 1 tablet (100 mg total) by mouth once daily.    naproxen sodium (ANAPROX) 220 MG tablet Take 220 mg by mouth as needed.    prednisoLONE acetate (PRED FORTE) 1 % DrpS Apply 2 drops  to left ear BID    sildenafiL (VIAGRA) 50 MG tablet Take 1 tablet (50 mg total) by mouth daily as needed for Erectile Dysfunction. Take 1 hour before sexual activity on an empty stomach.    tamsulosin (FLOMAX) 0.4 mg Cap Take 1 capsule (0.4 mg total) by mouth once daily.     No current facility-administered medications for this visit.       Review of Systems   Constitutional: Negative for chills, decreased appetite, malaise/fatigue, night sweats, weight gain and weight loss.   Eyes:  Negative for blurred vision, double vision, visual disturbance and visual halos.   Cardiovascular:  Negative for chest pain, claudication, cyanosis, dyspnea on exertion, irregular heartbeat, leg swelling,  near-syncope, orthopnea, palpitations, paroxysmal nocturnal dyspnea and syncope.   Respiratory:  Negative for cough, hemoptysis, snoring, sputum production and wheezing.    Endocrine: Negative for cold intolerance, heat intolerance, polydipsia and polyphagia.   Hematologic/Lymphatic: Negative for adenopathy and bleeding problem. Does not bruise/bleed easily.   Skin:  Negative for flushing, itching, poor wound healing and rash.   Musculoskeletal:  Positive for arthritis. Negative for back pain, falls, gout, joint pain, joint swelling, muscle cramps, muscle weakness, myalgias, neck pain and stiffness.   Gastrointestinal:  Negative for bloating, abdominal pain, anorexia, diarrhea, dysphagia, excessive appetite, flatus, hematemesis, jaundice, melena and nausea.   Genitourinary:  Negative for hesitancy and incomplete emptying.   Neurological:  Negative for aphonia, brief paralysis, difficulty with concentration, disturbances in coordination, excessive daytime sleepiness, dizziness, focal weakness, light-headedness, loss of balance and weakness.   Psychiatric/Behavioral:  Negative for altered mental status, depression, hallucinations, hypervigilance, memory loss, substance abuse and suicidal ideas. The patient does not have insomnia and is not nervous/anxious.      Objective:   Physical Exam  Constitutional:       General: He is not in acute distress.     Appearance: He is well-developed. He is not diaphoretic.   HENT:      Head: Normocephalic and atraumatic.      Nose: Nose normal.      Mouth/Throat:      Pharynx: No oropharyngeal exudate.   Eyes:      General: No scleral icterus.        Right eye: No discharge.         Left eye: No discharge.      Conjunctiva/sclera: Conjunctivae normal.      Pupils: Pupils are equal, round, and reactive to light.   Neck:      Thyroid: No thyromegaly.      Vascular: No JVD.      Trachea: No tracheal deviation.   Cardiovascular:      Rate and Rhythm: Normal rate and regular rhythm.       Pulses: Intact distal pulses.      Heart sounds: Normal heart sounds. No murmur heard.    No friction rub. No gallop.   Pulmonary:      Effort: Pulmonary effort is normal. No respiratory distress.      Breath sounds: Normal breath sounds. No stridor. No wheezing or rales.   Chest:      Chest wall: No tenderness.   Abdominal:      General: Bowel sounds are normal. There is no distension.      Palpations: Abdomen is soft. There is no mass.      Tenderness: There is no abdominal tenderness.   Musculoskeletal:         General: No tenderness.      Cervical back: Normal range of motion and neck supple.   Lymphadenopathy:      Cervical: No cervical adenopathy.   Skin:     General: Skin is warm.      Coloration: Skin is not pale.      Findings: No erythema or rash.   Neurological:      Mental Status: He is alert and oriented to person, place, and time.      Cranial Nerves: No cranial nerve deficit.      Motor: No abnormal muscle tone.      Coordination: Coordination normal.      Deep Tendon Reflexes: Reflexes are normal and symmetric. Reflexes normal.   Psychiatric:         Behavior: Behavior normal.         Thought Content: Thought content normal.         Judgment: Judgment normal.       Assessment:     1. Coronary artery disease involving coronary bypass graft of native heart without angina pectoris    2. Heart failure with mid-range ejection fraction    3. Hypertension associated with diabetes    4. Non-STEMI (non-ST elevated myocardial infarction)    5. Paroxysmal atrial fibrillation    6. S/P CABG (coronary artery bypass graft)    7. Type 2 diabetes mellitus with hyperglycemia, without long-term current use of insulin    8. RODRIGUEZ (obstructive sleep apnea)      Plan:   Diagnoses and all orders for this visit:    Coronary artery disease involving coronary bypass graft of native heart without angina pectoris  -     Lipid Panel; Future    Heart failure with mid-range ejection fraction    Hypertension associated with  diabetes    Non-STEMI (non-ST elevated myocardial infarction)    Paroxysmal atrial fibrillation    S/P CABG (coronary artery bypass graft)    Type 2 diabetes mellitus with hyperglycemia, without long-term current use of insulin    RODRIGUEZ (obstructive sleep apnea)      Doing well. RTC 1 yr.  Counseled on importance of heart healthy diet low in saturated and trans fat and salt as well gradually starting a regular aerobic exercise regimen with goal of 30min 5x/week. Recommend BP diary. Call if systolic BP > 130 mmHg on checking repeatedly       n/a

## 2023-10-05 ENCOUNTER — EMERGENCY (EMERGENCY)
Facility: HOSPITAL | Age: 78
LOS: 1 days | Discharge: HOME | End: 2023-10-05
Attending: EMERGENCY MEDICINE | Admitting: EMERGENCY MEDICINE
Payer: MEDICARE

## 2023-10-05 VITALS
TEMPERATURE: 99 F | DIASTOLIC BLOOD PRESSURE: 107 MMHG | HEART RATE: 89 BPM | RESPIRATION RATE: 16 BRPM | SYSTOLIC BLOOD PRESSURE: 172 MMHG | OXYGEN SATURATION: 99 %

## 2023-10-05 DIAGNOSIS — M50.20 OTHER CERVICAL DISC DISPLACEMENT, UNSPECIFIED CERVICAL REGION: Chronic | ICD-10-CM

## 2023-10-05 DIAGNOSIS — M65.331 TRIGGER FINGER, RIGHT MIDDLE FINGER: Chronic | ICD-10-CM

## 2023-10-05 DIAGNOSIS — T14.8 OTHER INJURY OF UNSPECIFIED BODY REGION: Chronic | ICD-10-CM

## 2023-10-05 DIAGNOSIS — Z98.89 OTHER SPECIFIED POSTPROCEDURAL STATES: Chronic | ICD-10-CM

## 2023-10-05 LAB
ALBUMIN SERPL ELPH-MCNC: 4.3 G/DL — SIGNIFICANT CHANGE UP (ref 3.3–5)
ALP SERPL-CCNC: 52 U/L — SIGNIFICANT CHANGE UP (ref 40–120)
ALT FLD-CCNC: 23 U/L — SIGNIFICANT CHANGE UP (ref 4–41)
ANION GAP SERPL CALC-SCNC: 16 MMOL/L — HIGH (ref 7–14)
AST SERPL-CCNC: 22 U/L — SIGNIFICANT CHANGE UP (ref 4–40)
BASOPHILS # BLD AUTO: 0.03 K/UL — SIGNIFICANT CHANGE UP (ref 0–0.2)
BASOPHILS NFR BLD AUTO: 0.4 % — SIGNIFICANT CHANGE UP (ref 0–2)
BILIRUB SERPL-MCNC: 0.2 MG/DL — SIGNIFICANT CHANGE UP (ref 0.2–1.2)
BUN SERPL-MCNC: 13 MG/DL — SIGNIFICANT CHANGE UP (ref 7–23)
CALCIUM SERPL-MCNC: 9.5 MG/DL — SIGNIFICANT CHANGE UP (ref 8.4–10.5)
CHLORIDE SERPL-SCNC: 96 MMOL/L — LOW (ref 98–107)
CO2 SERPL-SCNC: 21 MMOL/L — LOW (ref 22–31)
CREAT SERPL-MCNC: 0.81 MG/DL — SIGNIFICANT CHANGE UP (ref 0.5–1.3)
EGFR: 91 ML/MIN/1.73M2 — SIGNIFICANT CHANGE UP
EOSINOPHIL # BLD AUTO: 0.02 K/UL — SIGNIFICANT CHANGE UP (ref 0–0.5)
EOSINOPHIL NFR BLD AUTO: 0.3 % — SIGNIFICANT CHANGE UP (ref 0–6)
FLUAV AG NPH QL: SIGNIFICANT CHANGE UP
FLUBV AG NPH QL: SIGNIFICANT CHANGE UP
GLUCOSE SERPL-MCNC: 167 MG/DL — HIGH (ref 70–99)
HCT VFR BLD CALC: 41.9 % — SIGNIFICANT CHANGE UP (ref 39–50)
HGB BLD-MCNC: 13.8 G/DL — SIGNIFICANT CHANGE UP (ref 13–17)
IANC: 6 K/UL — SIGNIFICANT CHANGE UP (ref 1.8–7.4)
IMM GRANULOCYTES NFR BLD AUTO: 0.6 % — SIGNIFICANT CHANGE UP (ref 0–0.9)
LYMPHOCYTES # BLD AUTO: 0.7 K/UL — LOW (ref 1–3.3)
LYMPHOCYTES # BLD AUTO: 9 % — LOW (ref 13–44)
MCHC RBC-ENTMCNC: 27.8 PG — SIGNIFICANT CHANGE UP (ref 27–34)
MCHC RBC-ENTMCNC: 32.9 GM/DL — SIGNIFICANT CHANGE UP (ref 32–36)
MCV RBC AUTO: 84.3 FL — SIGNIFICANT CHANGE UP (ref 80–100)
MONOCYTES # BLD AUTO: 0.96 K/UL — HIGH (ref 0–0.9)
MONOCYTES NFR BLD AUTO: 12.4 % — SIGNIFICANT CHANGE UP (ref 2–14)
NEUTROPHILS # BLD AUTO: 6 K/UL — SIGNIFICANT CHANGE UP (ref 1.8–7.4)
NEUTROPHILS NFR BLD AUTO: 77.3 % — HIGH (ref 43–77)
NRBC # BLD: 0 /100 WBCS — SIGNIFICANT CHANGE UP (ref 0–0)
NRBC # FLD: 0 K/UL — SIGNIFICANT CHANGE UP (ref 0–0)
NT-PROBNP SERPL-SCNC: 88 PG/ML — SIGNIFICANT CHANGE UP
PLATELET # BLD AUTO: 197 K/UL — SIGNIFICANT CHANGE UP (ref 150–400)
POTASSIUM SERPL-MCNC: 4.3 MMOL/L — SIGNIFICANT CHANGE UP (ref 3.5–5.3)
POTASSIUM SERPL-SCNC: 4.3 MMOL/L — SIGNIFICANT CHANGE UP (ref 3.5–5.3)
PROT SERPL-MCNC: 7.4 G/DL — SIGNIFICANT CHANGE UP (ref 6–8.3)
RBC # BLD: 4.97 M/UL — SIGNIFICANT CHANGE UP (ref 4.2–5.8)
RBC # FLD: 13.8 % — SIGNIFICANT CHANGE UP (ref 10.3–14.5)
RSV RNA NPH QL NAA+NON-PROBE: SIGNIFICANT CHANGE UP
SARS-COV-2 RNA SPEC QL NAA+PROBE: SIGNIFICANT CHANGE UP
SODIUM SERPL-SCNC: 133 MMOL/L — LOW (ref 135–145)
TROPONIN T, HIGH SENSITIVITY RESULT: 12 NG/L — SIGNIFICANT CHANGE UP
WBC # BLD: 7.76 K/UL — SIGNIFICANT CHANGE UP (ref 3.8–10.5)
WBC # FLD AUTO: 7.76 K/UL — SIGNIFICANT CHANGE UP (ref 3.8–10.5)

## 2023-10-05 PROCEDURE — 99284 EMERGENCY DEPT VISIT MOD MDM: CPT | Mod: GC

## 2023-10-05 NOTE — ED PROVIDER NOTE - OBJECTIVE STATEMENT
78-year-old male past medical history of hypertension, diabetes, and dementia presents for 3 days of generalized weakness and fatigue associated with a cough.  Denies fever, chills, nausea, vomiting, abdominal pain, hematuria, dysuria, constipation, diarrhea, or any other symptoms at this time. 78-year-old male past medical history of hypertension, diabetes, and dementia presents for 3 days of generalized weakness and fatigue associated with a cough.  Denies fever, chills, back pain, nausea, vomiting, abdominal pain, hematuria, dysuria, constipation, diarrhea, or any other symptoms at this time.

## 2023-10-05 NOTE — ED ADULT TRIAGE NOTE - CHIEF COMPLAINT QUOTE
Pt c/o weakness with difficulty ambulating  since yesterday 99.1 after covid vaccine.  Pt with hx of dementia

## 2023-10-05 NOTE — ED ADULT NURSE NOTE - NSFALLRISKINTERV_ED_ALL_ED
Assistance OOB with selected safe patient handling equipment if applicable/Assistance with ambulation/Communicate fall risk and risk factors to all staff, patient, and family/Monitor gait and stability/Monitor for mental status changes and reorient to person, place, and time, as needed/Provide visual cue: yellow wristband, yellow gown, etc/Reinforce activity limits and safety measures with patient and family/Toileting schedule using arm’s reach rule for commode and bathroom/Use of alarms - bed, stretcher, chair and/or video monitoring/Call bell, personal items and telephone in reach/Instruct patient to call for assistance before getting out of bed/chair/stretcher/Non-slip footwear applied when patient is off stretcher/[place] to call system/Physically safe environment - no spills, clutter or unnecessary equipment/Purposeful Proactive Rounding/Room/bathroom lighting operational, light cord in reach

## 2023-10-05 NOTE — ED PROVIDER NOTE - PROGRESS NOTE DETAILS
Spoke with wife Amy who said that patient has been endorsing weakness for the past 3 to 4 days with a cough but has otherwise been acting like himself and has no specific complaints.  States that he is not safe to be discharged on his own into a taxi as he may give the wrong direction.  Contacted social work regarding obtaining a ride.  Ale Moore, DO PGY1

## 2023-10-05 NOTE — ED PROVIDER NOTE - ATTENDING CONTRIBUTION TO CARE
I performed a face-to-face evaluation of the patient and performed a history and physical examination. I agree with the history and physical examination. If this was a PA visit, I personally saw the patient with the PA and performed a substantive portion of the visit including all aspects of the medical decision making.    Dementia. General weakness. No other complaints. Will evaluate for infectious cause or acute coronary syndrome. Labs, UA.

## 2023-10-05 NOTE — ED PROVIDER NOTE - PATIENT PORTAL LINK FT
You can access the FollowMyHealth Patient Portal offered by [facility] by registering at the following website: [email or web address]. By joining [facility] FollowMyHealth portal, you will also be able to view your health information using other applications (apps) compatible with our system.

## 2023-10-05 NOTE — ED PROVIDER NOTE - NSFOLLOWUPINSTRUCTIONS_ED_ALL_ED_FT
You came to the emergency department for weakness. We performed blood work and EKG and you had normal results and can be discharged home. You likely have a viral illness. Please rest and drink plenty of fluids for the next few days. Please take Tylenol for fever and ibuprofen for pain.    Please return to the emergency department if you develop pain with urinating, vomiting and are unable to keep down fluids and food, chest pain, shortness of breath, abdominal pain, or you otherwise feel unwell.     Please follow up with primary care doctor in 2-3 days to ensure that you are getting better.

## 2023-10-05 NOTE — ED PROVIDER NOTE - PHYSICAL EXAMINATION
Ale Moore DO (PGY1)   Physical Exam:    Gen: NAD, AOx3, non-toxic appearing, able to ambulate without assistance  Lung: CTAB, no respiratory distress, no wheezes/rhonchi/rales B/L  CV: RRR, no murmurs, rubs or gallops  Abd: soft, NT, ND, no guarding, no rigidity, no rebound tenderness, no CVA tenderness

## 2023-10-05 NOTE — ED PROVIDER NOTE - CLINICAL SUMMARY MEDICAL DECISION MAKING FREE TEXT BOX
Judah: Dementia. General weakness. No other complaints. Will evaluate for infectious cause or acute coronary syndrome. Labs, UA.

## 2023-10-05 NOTE — ED ADULT NURSE NOTE - OBJECTIVE STATEMENT
Pt arrives to ED spot 10A, A&Ox2, pt states "they don't know why they are here", no family at bedside for hx. As per triage note, pt has been experiencing weakness and difficulty ambulating since receiving COVID vaccine yesterday. Pt denies chest pain, SOB, nausea, vomiting, diarrhea, abd pain, headache, lightheadedness. NAD. Respirations are even and unlabored. 20G placed to L forearm, labs drawn and sent.

## 2023-10-06 VITALS
DIASTOLIC BLOOD PRESSURE: 98 MMHG | OXYGEN SATURATION: 99 % | HEART RATE: 81 BPM | SYSTOLIC BLOOD PRESSURE: 156 MMHG | TEMPERATURE: 98 F | RESPIRATION RATE: 17 BRPM

## 2023-10-06 LAB
APPEARANCE UR: CLEAR — SIGNIFICANT CHANGE UP
BILIRUB UR-MCNC: NEGATIVE — SIGNIFICANT CHANGE UP
COLOR SPEC: YELLOW — SIGNIFICANT CHANGE UP
DIFF PNL FLD: NEGATIVE — SIGNIFICANT CHANGE UP
GLUCOSE UR QL: >=1000 MG/DL
KETONES UR-MCNC: 40 MG/DL
LEUKOCYTE ESTERASE UR-ACNC: NEGATIVE — SIGNIFICANT CHANGE UP
NITRITE UR-MCNC: NEGATIVE — SIGNIFICANT CHANGE UP
PH UR: 6.5 — SIGNIFICANT CHANGE UP (ref 5–8)
PROT UR-MCNC: SIGNIFICANT CHANGE UP MG/DL
SP GR SPEC: 1.02 — SIGNIFICANT CHANGE UP (ref 1–1.03)
TROPONIN T, HIGH SENSITIVITY RESULT: 14 NG/L — SIGNIFICANT CHANGE UP
UROBILINOGEN FLD QL: 0.2 MG/DL — SIGNIFICANT CHANGE UP (ref 0.2–1)

## 2023-10-06 NOTE — PROVIDER CONTACT NOTE (OTHER) - SITUATION
MD requesting pt assistance with transportation. Pt with hx of Dementia and family unable to . Writer contacted pt's wife, Amy, at [phone]. Wife confirmed home chart address to be

## 2023-10-06 NOTE — PROVIDER CONTACT NOTE (OTHER) - BACKGROUND
accurate and is home to accept pt. Pt resides in private home with family. Writer arranged EMS transport for pt to return home with [facility] #[phone]. Writer spoke with Philomena who provided

## 2023-10-06 NOTE — PROVIDER CONTACT NOTE (OTHER) - ASSESSMENT
trip #[id number] for p/up within the next 60-90 minutes. Non emergent transportation form to be placed in pt's chart.

## 2024-12-23 NOTE — H&P ADULT - PROBLEM SELECTOR PLAN 9
Transitional Care Management Telephone Call Attempt    Discharge Date: 12/18/24  Discharge Location: [facility]: [facility]    Call Attempt Date: 12/23/2024  Call Attempt: Second   Lovenox 40mg

## 2024-12-30 NOTE — H&P ADULT - PROBLEM SELECTOR PLAN 3
pt mother states pt has fever, cough, shortness of breath, congestion
Na 131 today, Cl 97. Likely 2/2 poor PO intake over past few days.  -C/w IVF NS 75cc/hr
